# Patient Record
Sex: FEMALE | Race: BLACK OR AFRICAN AMERICAN | NOT HISPANIC OR LATINO
[De-identification: names, ages, dates, MRNs, and addresses within clinical notes are randomized per-mention and may not be internally consistent; named-entity substitution may affect disease eponyms.]

---

## 2017-06-14 ENCOUNTER — FORM ENCOUNTER (OUTPATIENT)
Age: 27
End: 2017-06-14

## 2017-06-15 ENCOUNTER — OUTPATIENT (OUTPATIENT)
Dept: OUTPATIENT SERVICES | Facility: HOSPITAL | Age: 27
LOS: 1 days | End: 2017-06-15
Payer: COMMERCIAL

## 2017-06-15 ENCOUNTER — APPOINTMENT (OUTPATIENT)
Dept: ULTRASOUND IMAGING | Facility: IMAGING CENTER | Age: 27
End: 2017-06-15

## 2017-06-15 DIAGNOSIS — R10.2 PELVIC AND PERINEAL PAIN: ICD-10-CM

## 2017-06-15 DIAGNOSIS — N83.201 UNSPECIFIED OVARIAN CYST, RIGHT SIDE: ICD-10-CM

## 2017-06-15 PROCEDURE — 76856 US EXAM PELVIC COMPLETE: CPT

## 2017-06-15 PROCEDURE — 76830 TRANSVAGINAL US NON-OB: CPT

## 2017-06-21 ENCOUNTER — APPOINTMENT (OUTPATIENT)
Dept: OBGYN | Facility: CLINIC | Age: 27
End: 2017-06-21

## 2017-07-03 ENCOUNTER — RX RENEWAL (OUTPATIENT)
Age: 27
End: 2017-07-03

## 2017-07-21 ENCOUNTER — OUTPATIENT (OUTPATIENT)
Dept: OUTPATIENT SERVICES | Facility: HOSPITAL | Age: 27
LOS: 1 days | End: 2017-07-21
Payer: COMMERCIAL

## 2017-07-21 VITALS
HEIGHT: 60 IN | OXYGEN SATURATION: 100 % | TEMPERATURE: 98 F | HEART RATE: 92 BPM | WEIGHT: 85.98 LBS | DIASTOLIC BLOOD PRESSURE: 72 MMHG | SYSTOLIC BLOOD PRESSURE: 108 MMHG | RESPIRATION RATE: 16 BRPM

## 2017-07-21 DIAGNOSIS — R10.2 PELVIC AND PERINEAL PAIN: ICD-10-CM

## 2017-07-21 DIAGNOSIS — Z01.818 ENCOUNTER FOR OTHER PREPROCEDURAL EXAMINATION: ICD-10-CM

## 2017-07-21 DIAGNOSIS — D25.9 LEIOMYOMA OF UTERUS, UNSPECIFIED: ICD-10-CM

## 2017-07-21 DIAGNOSIS — N60.09 SOLITARY CYST OF UNSPECIFIED BREAST: Chronic | ICD-10-CM

## 2017-07-21 LAB
BLD GP AB SCN SERPL QL: NEGATIVE — SIGNIFICANT CHANGE UP
HCT VFR BLD CALC: 35.3 % — SIGNIFICANT CHANGE UP (ref 34.5–45)
HGB BLD-MCNC: 12.1 G/DL — SIGNIFICANT CHANGE UP (ref 11.5–15.5)
MCHC RBC-ENTMCNC: 26.4 PG — LOW (ref 27–34)
MCHC RBC-ENTMCNC: 34.3 GM/DL — SIGNIFICANT CHANGE UP (ref 32–36)
MCV RBC AUTO: 76.9 FL — LOW (ref 80–100)
PLATELET # BLD AUTO: 262 K/UL — SIGNIFICANT CHANGE UP (ref 150–400)
RBC # BLD: 4.59 M/UL — SIGNIFICANT CHANGE UP (ref 3.8–5.2)
RBC # FLD: 12.4 % — SIGNIFICANT CHANGE UP (ref 10.3–14.5)
RH IG SCN BLD-IMP: POSITIVE — SIGNIFICANT CHANGE UP
WBC # BLD: 4.13 K/UL — SIGNIFICANT CHANGE UP (ref 3.8–10.5)
WBC # FLD AUTO: 4.13 K/UL — SIGNIFICANT CHANGE UP (ref 3.8–10.5)

## 2017-07-21 PROCEDURE — 85027 COMPLETE CBC AUTOMATED: CPT

## 2017-07-21 PROCEDURE — 86901 BLOOD TYPING SEROLOGIC RH(D): CPT

## 2017-07-21 PROCEDURE — G0463: CPT

## 2017-07-21 PROCEDURE — 86850 RBC ANTIBODY SCREEN: CPT

## 2017-07-21 PROCEDURE — 86900 BLOOD TYPING SEROLOGIC ABO: CPT

## 2017-07-21 RX ORDER — ACETAMINOPHEN 500 MG
975 TABLET ORAL ONCE
Qty: 0 | Refills: 0 | Status: COMPLETED | OUTPATIENT
Start: 2017-07-28 | End: 2017-07-28

## 2017-07-21 RX ORDER — OXYCODONE HYDROCHLORIDE 5 MG/1
10 TABLET ORAL ONCE
Qty: 0 | Refills: 0 | Status: DISCONTINUED | OUTPATIENT
Start: 2017-07-28 | End: 2017-07-28

## 2017-07-21 RX ORDER — CEFOTETAN DISODIUM 1 G
2 VIAL (EA) INJECTION ONCE
Qty: 0 | Refills: 0 | Status: DISCONTINUED | OUTPATIENT
Start: 2017-07-28 | End: 2017-08-12

## 2017-07-21 NOTE — H&P PST ADULT - NSANTHOSAYNRD_GEN_A_CORE
No. CAROLANN screening performed.  STOP BANG Legend: 0-2 = LOW Risk; 3-4 = INTERMEDIATE Risk; 5-8 = HIGH Risk

## 2017-07-21 NOTE — H&P PST ADULT - HISTORY OF PRESENT ILLNESS
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

## 2017-07-28 ENCOUNTER — OUTPATIENT (OUTPATIENT)
Dept: OUTPATIENT SERVICES | Facility: HOSPITAL | Age: 27
LOS: 1 days | End: 2017-07-28
Payer: COMMERCIAL

## 2017-07-28 ENCOUNTER — TRANSCRIPTION ENCOUNTER (OUTPATIENT)
Age: 27
End: 2017-07-28

## 2017-07-28 ENCOUNTER — RESULT REVIEW (OUTPATIENT)
Age: 27
End: 2017-07-28

## 2017-07-28 ENCOUNTER — APPOINTMENT (OUTPATIENT)
Dept: OBGYN | Facility: HOSPITAL | Age: 27
End: 2017-07-28

## 2017-07-28 VITALS — HEART RATE: 70 BPM | DIASTOLIC BLOOD PRESSURE: 58 MMHG | SYSTOLIC BLOOD PRESSURE: 94 MMHG | RESPIRATION RATE: 18 BRPM

## 2017-07-28 VITALS
HEIGHT: 62 IN | WEIGHT: 85.98 LBS | DIASTOLIC BLOOD PRESSURE: 65 MMHG | TEMPERATURE: 98 F | SYSTOLIC BLOOD PRESSURE: 94 MMHG | OXYGEN SATURATION: 100 % | RESPIRATION RATE: 18 BRPM | HEART RATE: 72 BPM

## 2017-07-28 DIAGNOSIS — D25.9 LEIOMYOMA OF UTERUS, UNSPECIFIED: ICD-10-CM

## 2017-07-28 DIAGNOSIS — N60.09 SOLITARY CYST OF UNSPECIFIED BREAST: Chronic | ICD-10-CM

## 2017-07-28 DIAGNOSIS — R10.2 PELVIC AND PERINEAL PAIN: ICD-10-CM

## 2017-07-28 DIAGNOSIS — Z98.890 OTHER SPECIFIED POSTPROCEDURAL STATES: ICD-10-CM

## 2017-07-28 LAB
HCG UR QL: NEGATIVE — SIGNIFICANT CHANGE UP
RH IG SCN BLD-IMP: POSITIVE — SIGNIFICANT CHANGE UP

## 2017-07-28 PROCEDURE — S2900 ROBOTIC SURGICAL SYSTEM: CPT | Mod: 80,NC

## 2017-07-28 PROCEDURE — C1765: CPT

## 2017-07-28 PROCEDURE — S2900 ROBOTIC SURGICAL SYSTEM: CPT | Mod: NC

## 2017-07-28 PROCEDURE — C1889: CPT

## 2017-07-28 PROCEDURE — S2900: CPT

## 2017-07-28 PROCEDURE — 81025 URINE PREGNANCY TEST: CPT

## 2017-07-28 PROCEDURE — 88305 TISSUE EXAM BY PATHOLOGIST: CPT | Mod: 26

## 2017-07-28 PROCEDURE — 86900 BLOOD TYPING SEROLOGIC ABO: CPT

## 2017-07-28 PROCEDURE — 58545 LAPAROSCOPIC MYOMECTOMY: CPT

## 2017-07-28 PROCEDURE — 58545 LAPAROSCOPIC MYOMECTOMY: CPT | Mod: 80

## 2017-07-28 PROCEDURE — 86901 BLOOD TYPING SEROLOGIC RH(D): CPT

## 2017-07-28 PROCEDURE — 88305 TISSUE EXAM BY PATHOLOGIST: CPT

## 2017-07-28 RX ORDER — ACETAMINOPHEN 500 MG
1000 TABLET ORAL ONCE
Qty: 0 | Refills: 0 | Status: COMPLETED | OUTPATIENT
Start: 2017-07-28 | End: 2017-07-28

## 2017-07-28 RX ORDER — KETOROLAC TROMETHAMINE 30 MG/ML
30 SYRINGE (ML) INJECTION ONCE
Qty: 0 | Refills: 0 | Status: DISCONTINUED | OUTPATIENT
Start: 2017-07-28 | End: 2017-07-28

## 2017-07-28 RX ORDER — BENZOCAINE AND MENTHOL 5; 1 G/100ML; G/100ML
1 LIQUID ORAL
Qty: 0 | Refills: 0 | Status: DISCONTINUED | OUTPATIENT
Start: 2017-07-28 | End: 2017-08-12

## 2017-07-28 RX ORDER — OXYCODONE HYDROCHLORIDE 5 MG/1
5 TABLET ORAL EVERY 4 HOURS
Qty: 0 | Refills: 0 | Status: DISCONTINUED | OUTPATIENT
Start: 2017-07-28 | End: 2017-07-28

## 2017-07-28 RX ORDER — CELECOXIB 200 MG/1
200 CAPSULE ORAL ONCE
Qty: 0 | Refills: 0 | Status: COMPLETED | OUTPATIENT
Start: 2017-07-28 | End: 2017-07-28

## 2017-07-28 RX ORDER — SODIUM CHLORIDE 9 MG/ML
1000 INJECTION, SOLUTION INTRAVENOUS
Qty: 0 | Refills: 0 | Status: DISCONTINUED | OUTPATIENT
Start: 2017-07-28 | End: 2017-08-12

## 2017-07-28 RX ORDER — ONDANSETRON 8 MG/1
4 TABLET, FILM COATED ORAL ONCE
Qty: 0 | Refills: 0 | Status: COMPLETED | OUTPATIENT
Start: 2017-07-28 | End: 2017-07-28

## 2017-07-28 RX ORDER — ACETAMINOPHEN 500 MG
975 TABLET ORAL EVERY 6 HOURS
Qty: 0 | Refills: 0 | Status: DISCONTINUED | OUTPATIENT
Start: 2017-07-28 | End: 2017-08-12

## 2017-07-28 RX ORDER — SODIUM CHLORIDE 9 MG/ML
3 INJECTION INTRAMUSCULAR; INTRAVENOUS; SUBCUTANEOUS EVERY 8 HOURS
Qty: 0 | Refills: 0 | Status: DISCONTINUED | OUTPATIENT
Start: 2017-07-28 | End: 2017-07-28

## 2017-07-28 RX ORDER — HYDROMORPHONE HYDROCHLORIDE 2 MG/ML
0.25 INJECTION INTRAMUSCULAR; INTRAVENOUS; SUBCUTANEOUS
Qty: 0 | Refills: 0 | Status: DISCONTINUED | OUTPATIENT
Start: 2017-07-28 | End: 2017-07-30

## 2017-07-28 RX ADMIN — Medication 975 MILLIGRAM(S): at 23:55

## 2017-07-28 RX ADMIN — Medication 1000 MILLIGRAM(S): at 18:34

## 2017-07-28 RX ADMIN — SODIUM CHLORIDE 125 MILLILITER(S): 9 INJECTION, SOLUTION INTRAVENOUS at 18:49

## 2017-07-28 RX ADMIN — HYDROMORPHONE HYDROCHLORIDE 0.25 MILLIGRAM(S): 2 INJECTION INTRAMUSCULAR; INTRAVENOUS; SUBCUTANEOUS at 18:34

## 2017-07-28 RX ADMIN — HYDROMORPHONE HYDROCHLORIDE 0.25 MILLIGRAM(S): 2 INJECTION INTRAMUSCULAR; INTRAVENOUS; SUBCUTANEOUS at 17:45

## 2017-07-28 RX ADMIN — ONDANSETRON 4 MILLIGRAM(S): 8 TABLET, FILM COATED ORAL at 22:53

## 2017-07-28 RX ADMIN — HYDROMORPHONE HYDROCHLORIDE 0.25 MILLIGRAM(S): 2 INJECTION INTRAMUSCULAR; INTRAVENOUS; SUBCUTANEOUS at 18:15

## 2017-07-28 RX ADMIN — OXYCODONE HYDROCHLORIDE 10 MILLIGRAM(S): 5 TABLET ORAL at 13:36

## 2017-07-28 RX ADMIN — Medication 30 MILLIGRAM(S): at 20:39

## 2017-07-28 RX ADMIN — SODIUM CHLORIDE 3 MILLILITER(S): 9 INJECTION INTRAMUSCULAR; INTRAVENOUS; SUBCUTANEOUS at 11:07

## 2017-07-28 RX ADMIN — Medication 400 MILLIGRAM(S): at 18:15

## 2017-07-28 RX ADMIN — Medication 975 MILLIGRAM(S): at 11:06

## 2017-07-28 RX ADMIN — Medication 975 MILLIGRAM(S): at 23:16

## 2017-07-28 NOTE — PROGRESS NOTE ADULT - PROBLEM SELECTOR PLAN 1
Neuro: c/w oral analgesia, Toradol 30 IVP now, cepacol logenzes for throat pain  CV: hemodynamically stable  Pulm: saturating well on room air, encourage incentive spirometry and ambulation  GI: advance to reg diet, mylicon, colace, and zofran as needed  : voiding adequately  Heme: ambulation, SCDs for DVT ppx  Dispo: discharge home when pain improved    dw Dr Baires

## 2017-07-28 NOTE — PROGRESS NOTE ADULT - SUBJECTIVE AND OBJECTIVE BOX
Patient seen and examined at bedside. Patient complains of throat pain and of cramping abdominal pain, which is normal menstrual cramps for her.  Patient is immediately s/p voiding 200 cc, which relieved some of the abdominal pain.  Patient is s/p IV tylenol, dilaudid, and oxycodone around 6 pm  Patient is not yet tolerating clears. Has not yet passed flatus. Denies CP, SOB, N/V, fevers, and chills.    Vital Signs Last 24 Hours  T(C): 36.3 (07-28-17 @ 17:35), Max: 36.7 (07-28-17 @ 11:14)  HR: 76 (07-28-17 @ 20:30) (60 - 91)  BP: 80/42 (07-28-17 @ 20:30) (80/42 - 101/49)  RR: 16 (07-28-17 @ 20:30) (15 - 18)  SpO2: 95% (07-28-17 @ 20:30) (95% - 100%)    I&O's Summary    28 Jul 2017 07:01  -  28 Jul 2017 20:37  --------------------------------------------------------  IN: 375 mL / OUT: 200 mL / NET: 175 mL        Physical Exam:  General: NAD  CV: NR, RR, S1, S2, no M/R/G  Lungs: CTA-B  Abdomen: Soft, non-distended, appropriately tender  Incisions: CDI  Vaginal bleeding minimal  Ext: No pain or swelling          MEDICATIONS  (STANDING):  cefoTEtan  IVPB 2 Gram(s) IV Intermittent once  lactated ringers. 1000 milliLiter(s) (125 mL/Hr) IV Continuous <Continuous>  ketorolac   Injectable 30 milliGRAM(s) IV Push once    MEDICATIONS  (PRN):  HYDROmorphone  Injectable 0.25 milliGRAM(s) IV Push every 10 minutes PRN Moderate Pain  ondansetron Injectable 4 milliGRAM(s) IV Push once PRN Nausea and/or Vomiting  benzocaine 15 mG/menthol 3.6 mG Lozenge 1 Lozenge Oral every 1 hour PRN Sore Throat

## 2017-07-28 NOTE — ASU DISCHARGE PLAN (ADULT/PEDIATRIC). - NOTIFY
GYN Fever>100.4/Bleeding that does not stop/Swelling that continues/Numbness, tingling/Persistent Nausea and Vomiting/Pain not relieved by Medications/Unable to Urinate/Numbness, color, or temperature change to extremity/Increased Irritability or Sluggishness/Excessive Diarrhea/Inability to Tolerate Liquids or Foods

## 2017-07-28 NOTE — ASU DISCHARGE PLAN (ADULT/PEDIATRIC). - MEDICATION SUMMARY - MEDICATIONS TO TAKE
I will START or STAY ON the medications listed below when I get home from the hospital:    Percocet 5/325 oral tablet  -- 1 tab(s) by mouth every 6 hours, As Needed MDD:4 tabs  -- Caution federal law prohibits the transfer of this drug to any person other  than the person for whom it was prescribed.  May cause drowsiness.  Alcohol may intensify this effect.  Use care when operating dangerous machinery.  This prescription cannot be refilled.  This product contains acetaminophen.  Do not use  with any other product containing acetaminophen to prevent possible liver damage.  Using more of this medication than prescribed may cause serious breathing problems.    -- Indication: For Pain    naproxen 500 mg oral tablet  -- 1 tab(s) by mouth 2 times a day, As Needed dysmenorrhea  -- Indication: For home med

## 2017-08-01 ENCOUNTER — APPOINTMENT (OUTPATIENT)
Dept: OBGYN | Facility: CLINIC | Age: 27
End: 2017-08-01

## 2017-08-01 ENCOUNTER — INPATIENT (INPATIENT)
Facility: HOSPITAL | Age: 27
LOS: 1 days | Discharge: HOME | DRG: 948 | End: 2017-08-03
Attending: OBSTETRICS & GYNECOLOGY | Admitting: OBSTETRICS & GYNECOLOGY
Payer: COMMERCIAL

## 2017-08-01 VITALS
TEMPERATURE: 98 F | RESPIRATION RATE: 18 BRPM | HEART RATE: 78 BPM | SYSTOLIC BLOOD PRESSURE: 115 MMHG | DIASTOLIC BLOOD PRESSURE: 72 MMHG | OXYGEN SATURATION: 98 %

## 2017-08-01 DIAGNOSIS — N60.09 SOLITARY CYST OF UNSPECIFIED BREAST: Chronic | ICD-10-CM

## 2017-08-01 DIAGNOSIS — R10.2 PELVIC AND PERINEAL PAIN: ICD-10-CM

## 2017-08-01 DIAGNOSIS — G89.18 OTHER ACUTE POSTPROCEDURAL PAIN: ICD-10-CM

## 2017-08-01 DIAGNOSIS — D25.9 LEIOMYOMA OF UTERUS, UNSPECIFIED: Chronic | ICD-10-CM

## 2017-08-01 LAB
ALBUMIN SERPL ELPH-MCNC: 4.1 G/DL — SIGNIFICANT CHANGE UP (ref 3.3–5)
ALP SERPL-CCNC: 44 U/L — SIGNIFICANT CHANGE UP (ref 40–120)
ALT FLD-CCNC: 28 U/L RC — SIGNIFICANT CHANGE UP (ref 10–45)
ANION GAP SERPL CALC-SCNC: 12 MMOL/L — SIGNIFICANT CHANGE UP (ref 5–17)
APPEARANCE UR: CLEAR — SIGNIFICANT CHANGE UP
AST SERPL-CCNC: 23 U/L — SIGNIFICANT CHANGE UP (ref 10–40)
BACTERIA # UR AUTO: ABNORMAL /HPF
BASE EXCESS BLDV CALC-SCNC: 5.3 MMOL/L — HIGH (ref -2–2)
BASOPHILS # BLD AUTO: 0 K/UL — SIGNIFICANT CHANGE UP (ref 0–0.2)
BASOPHILS NFR BLD AUTO: 0.7 % — SIGNIFICANT CHANGE UP (ref 0–2)
BILIRUB SERPL-MCNC: 0.7 MG/DL — SIGNIFICANT CHANGE UP (ref 0.2–1.2)
BILIRUB UR-MCNC: NEGATIVE — SIGNIFICANT CHANGE UP
BUN SERPL-MCNC: 5 MG/DL — LOW (ref 7–23)
CA-I SERPL-SCNC: 1.17 MMOL/L — SIGNIFICANT CHANGE UP (ref 1.12–1.3)
CALCIUM SERPL-MCNC: 8.9 MG/DL — SIGNIFICANT CHANGE UP (ref 8.4–10.5)
CHLORIDE BLDV-SCNC: 104 MMOL/L — SIGNIFICANT CHANGE UP (ref 96–108)
CHLORIDE SERPL-SCNC: 103 MMOL/L — SIGNIFICANT CHANGE UP (ref 96–108)
CO2 BLDV-SCNC: 32 MMOL/L — HIGH (ref 22–30)
CO2 SERPL-SCNC: 28 MMOL/L — SIGNIFICANT CHANGE UP (ref 22–31)
COLOR SPEC: COLORLESS — SIGNIFICANT CHANGE UP
CREAT SERPL-MCNC: 0.63 MG/DL — SIGNIFICANT CHANGE UP (ref 0.5–1.3)
DIFF PNL FLD: NEGATIVE — SIGNIFICANT CHANGE UP
EOSINOPHIL # BLD AUTO: 0.1 K/UL — SIGNIFICANT CHANGE UP (ref 0–0.5)
EOSINOPHIL NFR BLD AUTO: 2 % — SIGNIFICANT CHANGE UP (ref 0–6)
EPI CELLS # UR: SIGNIFICANT CHANGE UP /HPF
GAS PNL BLDV: 137 MMOL/L — SIGNIFICANT CHANGE UP (ref 136–145)
GAS PNL BLDV: SIGNIFICANT CHANGE UP
GAS PNL BLDV: SIGNIFICANT CHANGE UP
GLUCOSE BLDV-MCNC: 118 MG/DL — HIGH (ref 70–99)
GLUCOSE SERPL-MCNC: 121 MG/DL — HIGH (ref 70–99)
GLUCOSE UR QL: NEGATIVE — SIGNIFICANT CHANGE UP
HCO3 BLDV-SCNC: 31 MMOL/L — HIGH (ref 21–29)
HCT VFR BLD CALC: 32.2 % — LOW (ref 34.5–45)
HCT VFR BLDA CALC: 35 % — LOW (ref 39–50)
HGB BLD CALC-MCNC: 11.4 G/DL — LOW (ref 11.5–15.5)
HGB BLD-MCNC: 11.1 G/DL — LOW (ref 11.5–15.5)
KETONES UR-MCNC: NEGATIVE — SIGNIFICANT CHANGE UP
LACTATE BLDV-MCNC: 1.2 MMOL/L — SIGNIFICANT CHANGE UP (ref 0.7–2)
LEUKOCYTE ESTERASE UR-ACNC: NEGATIVE — SIGNIFICANT CHANGE UP
LYMPHOCYTES # BLD AUTO: 1.6 K/UL — SIGNIFICANT CHANGE UP (ref 1–3.3)
LYMPHOCYTES # BLD AUTO: 41.9 % — SIGNIFICANT CHANGE UP (ref 13–44)
MCHC RBC-ENTMCNC: 28.4 PG — SIGNIFICANT CHANGE UP (ref 27–34)
MCHC RBC-ENTMCNC: 34.4 GM/DL — SIGNIFICANT CHANGE UP (ref 32–36)
MCV RBC AUTO: 82.7 FL — SIGNIFICANT CHANGE UP (ref 80–100)
MONOCYTES # BLD AUTO: 0.3 K/UL — SIGNIFICANT CHANGE UP (ref 0–0.9)
MONOCYTES NFR BLD AUTO: 6.7 % — SIGNIFICANT CHANGE UP (ref 2–14)
NEUTROPHILS # BLD AUTO: 1.9 K/UL — SIGNIFICANT CHANGE UP (ref 1.8–7.4)
NEUTROPHILS NFR BLD AUTO: 48.7 % — SIGNIFICANT CHANGE UP (ref 43–77)
NITRITE UR-MCNC: NEGATIVE — SIGNIFICANT CHANGE UP
PCO2 BLDV: 53 MMHG — HIGH (ref 35–50)
PH BLDV: 7.38 — SIGNIFICANT CHANGE UP (ref 7.35–7.45)
PH UR: 7 — SIGNIFICANT CHANGE UP (ref 5–8)
PLATELET # BLD AUTO: 186 K/UL — SIGNIFICANT CHANGE UP (ref 150–400)
PO2 BLDV: 38 MMHG — SIGNIFICANT CHANGE UP (ref 25–45)
POTASSIUM BLDV-SCNC: 4.3 MMOL/L — SIGNIFICANT CHANGE UP (ref 3.5–5)
POTASSIUM SERPL-MCNC: 3.5 MMOL/L — SIGNIFICANT CHANGE UP (ref 3.5–5.3)
POTASSIUM SERPL-SCNC: 3.5 MMOL/L — SIGNIFICANT CHANGE UP (ref 3.5–5.3)
PROT SERPL-MCNC: 7 G/DL — SIGNIFICANT CHANGE UP (ref 6–8.3)
PROT UR-MCNC: NEGATIVE — SIGNIFICANT CHANGE UP
RBC # BLD: 3.9 M/UL — SIGNIFICANT CHANGE UP (ref 3.8–5.2)
RBC # FLD: 10.9 % — SIGNIFICANT CHANGE UP (ref 10.3–14.5)
RBC CASTS # UR COMP ASSIST: SIGNIFICANT CHANGE UP /HPF (ref 0–2)
SAO2 % BLDV: 67 % — SIGNIFICANT CHANGE UP (ref 67–88)
SODIUM SERPL-SCNC: 143 MMOL/L — SIGNIFICANT CHANGE UP (ref 135–145)
SP GR SPEC: 1.01 — LOW (ref 1.01–1.02)
SURGICAL PATHOLOGY STUDY: SIGNIFICANT CHANGE UP
UROBILINOGEN FLD QL: NEGATIVE — SIGNIFICANT CHANGE UP
WBC # BLD: 3.8 K/UL — SIGNIFICANT CHANGE UP (ref 3.8–10.5)
WBC # FLD AUTO: 3.8 K/UL — SIGNIFICANT CHANGE UP (ref 3.8–10.5)
WBC UR QL: SIGNIFICANT CHANGE UP /HPF (ref 0–5)

## 2017-08-01 PROCEDURE — 99285 EMERGENCY DEPT VISIT HI MDM: CPT

## 2017-08-01 PROCEDURE — 74177 CT ABD & PELVIS W/CONTRAST: CPT | Mod: 26

## 2017-08-01 RX ORDER — SENNA PLUS 8.6 MG/1
2 TABLET ORAL AT BEDTIME
Qty: 0 | Refills: 0 | Status: DISCONTINUED | OUTPATIENT
Start: 2017-08-01 | End: 2017-08-02

## 2017-08-01 RX ORDER — SODIUM CHLORIDE 9 MG/ML
1000 INJECTION INTRAMUSCULAR; INTRAVENOUS; SUBCUTANEOUS ONCE
Qty: 0 | Refills: 0 | Status: COMPLETED | OUTPATIENT
Start: 2017-08-01 | End: 2017-08-01

## 2017-08-01 RX ORDER — SIMETHICONE 80 MG/1
80 TABLET, CHEWABLE ORAL EVERY 12 HOURS
Qty: 0 | Refills: 0 | Status: DISCONTINUED | OUTPATIENT
Start: 2017-08-01 | End: 2017-08-03

## 2017-08-01 RX ORDER — ACETAMINOPHEN 500 MG
975 TABLET ORAL EVERY 6 HOURS
Qty: 0 | Refills: 0 | Status: DISCONTINUED | OUTPATIENT
Start: 2017-08-01 | End: 2017-08-03

## 2017-08-01 RX ORDER — DOCUSATE SODIUM 100 MG
100 CAPSULE ORAL THREE TIMES A DAY
Qty: 0 | Refills: 0 | Status: DISCONTINUED | OUTPATIENT
Start: 2017-08-01 | End: 2017-08-03

## 2017-08-01 RX ORDER — OXYCODONE HYDROCHLORIDE 5 MG/1
5 TABLET ORAL EVERY 4 HOURS
Qty: 0 | Refills: 0 | Status: DISCONTINUED | OUTPATIENT
Start: 2017-08-01 | End: 2017-08-03

## 2017-08-01 RX ORDER — ONDANSETRON 8 MG/1
4 TABLET, FILM COATED ORAL ONCE
Qty: 0 | Refills: 0 | Status: COMPLETED | OUTPATIENT
Start: 2017-08-01 | End: 2017-08-01

## 2017-08-01 RX ORDER — IBUPROFEN 200 MG
600 TABLET ORAL EVERY 6 HOURS
Qty: 0 | Refills: 0 | Status: DISCONTINUED | OUTPATIENT
Start: 2017-08-01 | End: 2017-08-02

## 2017-08-01 RX ORDER — ACETAMINOPHEN 500 MG
1000 TABLET ORAL ONCE
Qty: 0 | Refills: 0 | Status: COMPLETED | OUTPATIENT
Start: 2017-08-01 | End: 2017-08-01

## 2017-08-01 RX ADMIN — ONDANSETRON 4 MILLIGRAM(S): 8 TABLET, FILM COATED ORAL at 18:10

## 2017-08-01 RX ADMIN — Medication 400 MILLIGRAM(S): at 18:10

## 2017-08-01 RX ADMIN — SODIUM CHLORIDE 2000 MILLILITER(S): 9 INJECTION INTRAMUSCULAR; INTRAVENOUS; SUBCUTANEOUS at 16:42

## 2017-08-01 NOTE — H&P ADULT - PROBLEM SELECTOR PLAN 1
- Admit to GYN   - PO Motrin, Tylenol, and oxycodone PRN   - senna/colace  - Zofran prn for nausea  - regular diet  - DVT ppx: OOB

## 2017-08-01 NOTE — CONSULT NOTE ADULT - ATTENDING COMMENTS
Agree with above assessment and recommendations. POD4 with significant abd pain, nausea and lack of bowel movement. R/o obstruction, infection, surgical bleeding with CT scan. Admit overnight for obs and pain control

## 2017-08-01 NOTE — ED ADULT NURSE NOTE - OBJECTIVE STATEMENT
27F comes to ED c/o abdominal pain, nausea, vomiting, weakness and headache since Friday. 27F comes to ED c/o abdominal pain, nausea, vomiting, weakness and headache since Friday. On Friday she had 6 uterine fibroids removed and ever since, she has had nausea with vomiting. She states she was able to eat today without vomiting- last episode of vomiting was yesterday. She also currently has her menstrual period. She denies SOB/chest pain/D/dizziness/fever. She has lower abdominal pain at incision, headache and back pain. She also has intermittent lightheadedness. Taking oxycodone at home for pain. Family at bedside. Will continue to monitor.

## 2017-08-01 NOTE — H&P ADULT - HISTORY OF PRESENT ILLNESS
Patient is 26yo G0 LMP 7/30 presenting with chief complaint of abdominal pain status post robotic assisted myomectomy on 7/28. Patient notes that pain started on 7/28. Her pain has not improved post op and worsened on 7/29 after discharge. Patient notes the pain was initially central and now is diffuse. It is 9/10 pain and sharp. It is associated with persistent nausea and vomiting over the last 2 days. It is also associated with bilateral back pain. Patient is unable to tolerate PO since her case. Pain has not improved with Naprosyn and Oxycodone. She denies chest pain, shortness of breath, fevers, and chills. Patient denies abnormal vaginal bleeding, abnormal vaginal discharge, and spotting. Patient currently has her period. Patient denies dysuria, hematuria, back pain and urinary frequency. Patient has not had a bowel movement since the morning of 7/28 and passed flatus sporadically (once a day). She denies constipation and diarrhea.    In ED, patient received IV Tylenol x1, with mild relief of pain to 7/10. She had a sandwich and ginger ale, and endorses hunger. Still some nausea; however, no vomiting.   Patient was taking oxycodone around the clock at home, not taking naproxyn or Tylenol.     POb: G0    PGyn: denies h/o AUB, fibroids, ovarian cysts, PID, GC/CL, HIV, Hepatitis, abnormal PAPs, infertility, GYN surgery, menstrual history, number of sexual partners    PMH: denies (asthma, HTN, thyroid disorder, DM), h/o hospitalizations    PSH: robotic assisted myomectomy    Meds: Oxycodone, Naprosyn    All: Nkda    Social: denies history smoking cigarettes, alcohol dependence, illicit drug use    Family hx: denies family history of ovarian, uterine, cervical, breast, colon cancer (if yes relationship, alive or not, age or diagnosis)

## 2017-08-01 NOTE — ED ADULT NURSE NOTE - CHPI ED SYMPTOMS NEG
no fever/no diarrhea/no chills/no hematuria/no burning urination/no dysuria/no abdominal distension/no blood in stool

## 2017-08-01 NOTE — H&P ADULT - NSHPPHYSICALEXAM_GEN_ALL_CORE
Gen: appears uncomfortable 2/2 pain  CV RRR, no murmurs  Pulm CTAB  Abd: hypoactive BS, soft, mod-sev lower abdominal tenderness with voluntary guarding, no rebound tenderness.  Ext: warm/well perfused, no edema

## 2017-08-01 NOTE — ED PROVIDER NOTE - OBJECTIVE STATEMENT
26 y/o F with PMHX of fibroids s/p myomectomy 3 days ago c/o abd pain, Denies nausea, vomiting. Currently taking Naproxen and oxycodone 28 y/o F with PMHX of fibroids s/p myomectomy 3 days ago presents with abd pain. Pain is dull, diffuse, states she had the pain after surgery and on discharge and has not improved. Pt currently taking naproxen and oxycodone.  Denies nausea, vomiting. + Flatus. No fever or chills.

## 2017-08-01 NOTE — ED PROVIDER NOTE - MEDICAL DECISION MAKING DETAILS
****ATTENDING**** 26yo f s/p myomectomy w abd pain, n/v. Pt with diffuse tenderness. Afebrile in ED. Check Labs, CT to eval for sbo vs infection. Pain control and IVF and re eval.

## 2017-08-01 NOTE — ED PROVIDER NOTE - PROGRESS NOTE DETAILS
Patient was re-evaluated, found in no acute distress, calm, speaking in complete sentences, describing marked improvement of symptoms following treatment at ED. CT report is pending for final disposition. Dr. Ez Fernandez Ob/Gyn re-evaluated patient and describe plan to admit patient. Patient admitted under care of Dr. Solorio. Dr. Ez Fernandez

## 2017-08-01 NOTE — ED CLERICAL - BED REQUESTED
"Pt states that she hasn't been able to sleep for past 3 days, feels fatigued, has anxiety per pt ""It's hard to describe. My joints hurt all over. I talked to the doctor few weeks ago at my last visit, I was fatigued and joints hurt then. \"" had dose change 3/29/17.  Will have Dr Francia Chaney review  " 01-Aug-2017 23:11

## 2017-08-01 NOTE — CONSULT NOTE ADULT - ASSESSMENT
26yo G0 status post robotic assisted myomectomy POD 4 (7/28) presents with abdominal pain, nausea and vomiting. Differential diagnosis includes ileus, sbo, enterotomy, lower on differential is abscess, hematoma/seroma,  injury. Recommend ordering CT abdomen and pelvis w/ and wo IV and PO contrast.     Sue, R3 willi.w Dr. Baires

## 2017-08-01 NOTE — H&P ADULT - NSHPLABSRESULTS_GEN_ALL_CORE
EXAM:  CT ABDOMEN AND PELVIS OC IC                            PROCEDURE DATE:  08/01/2017        INTERPRETATION:  CLINICAL INFORMATION: Abdominal pain status post   myomectomy on 07/28.Evaluate for small bowel obstruction.    COMPARISON: No available comparisons.    PROCEDURE:   CT of the Abdomen and Pelvis was performed with intravenous contrast.   Intravenous contrast: 90 ml Omnipaque 350. 10 ml discarded.  Oral contrast: positive contrast was administered.  Sagittal and coronal reformatswere performed.    FINDINGS:    LOWER CHEST: Trace bilateral pleural effusions and bibasilar atelectasis.    LIVER: Mild nonspecific periportal edema. Focal hepatic steatosis along   the falciform ligament. Subcentimeter low-attenuation lesion in theright   hepatic lobe is too small to characterize.  BILE DUCTS: Normal caliber.  GALLBLADDER: Nonspecific pericholecystic edema.  SPLEEN: Within normal limits.  PANCREAS: Within normal limits.  ADRENALS: Within normal limits.  KIDNEYS/URETERS: Withinnormal limits.    BLADDER: Within normal limits.  REPRODUCTIVE ORGANS: Left adnexal cyst measuring 2.1 x 1.6 cm.   Postsurgical changes surrounding the uterus consistent with patient's   known history of myomectomy.    BOWEL: No bowel obstruction or overt bowel wall thickening. Appendix is   not discretely identified.  PERITONEUM: Small to moderate volume of pneumoperitoneum and small volume   abdominal and pelvic ascites. No loculated rim-enhancing fluid collection   to suggest abscess. No mesenteric adenopathy.  VESSELS:  Within normal limits.  RETROPERITONEUM: No lymphadenopathy.    ABDOMINAL WALL: Thickening of the soft tissues at the level of umbilicus   likely due to recent surgery. Air tracking within the anterior abdominal   wall and intramuscular fat planes from recent surgery.  BONES: Within normal limits.    IMPRESSION:     Small to moderate volume of pneumoperitoneum and small volume abdominal   and pelvic ascites, likely postsurgical. No loculated rim-enhancing   collection to suggest abscess. No bowel obstruction or overt bowel wall   thickening. 11.1   3.8   )-----------( 186      ( 01 Aug 2017 16:41 )             32.2     Urinalysis Basic - ( 01 Aug 2017 18:49 )    Color: x / Appearance: Clear / S.007 / pH: x  Gluc: x / Ketone: Negative  / Bili: Negative / Urobili: Negative   Blood: x / Protein: Negative / Nitrite: Negative   Leuk Esterase: Negative / RBC: 0-2 /HPF / WBC 0-2 /HPF   Sq Epi: x / Non Sq Epi: OCC /HPF / Bacteria: Few /HPF        EXAM:  CT ABDOMEN AND PELVIS OC IC                            PROCEDURE DATE:  2017        INTERPRETATION:  CLINICAL INFORMATION: Abdominal pain status post   myomectomy on .Evaluate for small bowel obstruction.    COMPARISON: No available comparisons.    PROCEDURE:   CT of the Abdomen and Pelvis was performed with intravenous contrast.   Intravenous contrast: 90 ml Omnipaque 350. 10 ml discarded.  Oral contrast: positive contrast was administered.  Sagittal and coronal reformatswere performed.    FINDINGS:    LOWER CHEST: Trace bilateral pleural effusions and bibasilar atelectasis.    LIVER: Mild nonspecific periportal edema. Focal hepatic steatosis along   the falciform ligament. Subcentimeter low-attenuation lesion in theright   hepatic lobe is too small to characterize.  BILE DUCTS: Normal caliber.  GALLBLADDER: Nonspecific pericholecystic edema.  SPLEEN: Within normal limits.  PANCREAS: Within normal limits.  ADRENALS: Within normal limits.  KIDNEYS/URETERS: Withinnormal limits.    BLADDER: Within normal limits.  REPRODUCTIVE ORGANS: Left adnexal cyst measuring 2.1 x 1.6 cm.   Postsurgical changes surrounding the uterus consistent with patient's   known history of myomectomy.    BOWEL: No bowel obstruction or overt bowel wall thickening. Appendix is   not discretely identified.  PERITONEUM: Small to moderate volume of pneumoperitoneum and small volume   abdominal and pelvic ascites. No loculated rim-enhancing fluid collection   to suggest abscess. No mesenteric adenopathy.  VESSELS:  Within normal limits.  RETROPERITONEUM: No lymphadenopathy.    ABDOMINAL WALL: Thickening of the soft tissues at the level of umbilicus   likely due to recent surgery. Air tracking within the anterior abdominal   wall and intramuscular fat planes from recent surgery.  BONES: Within normal limits.    IMPRESSION:     Small to moderate volume of pneumoperitoneum and small volume abdominal   and pelvic ascites, likely postsurgical. No loculated rim-enhancing   collection to suggest abscess. No bowel obstruction or overt bowel wall   thickening.

## 2017-08-01 NOTE — CONSULT NOTE ADULT - SUBJECTIVE AND OBJECTIVE BOX
Patient is 28yo G0 LMP 7/30 presenting with chief complaint of abdominal pain status post robotic assisted myomectomy on 7/28. Patient notes that pain started on 7/28. Her pain has not improved post op and worsened on 7/29 after discharge. Patient notes the pain was initially central and now is diffuse. It is 9/10 pain and sharp. It is associated with persistent nausea and vomiting over the last 2 days. It is also associated with bilateral back pain. Patient is unable to tolerate PO since her case. Pain has not improved with Naprosyn and Oxycodone. She denies chest pain, shortness of breath, fevers, and chills. Patient denies abnormal vaginal bleeding, abnormal vaginal discharge, and spotting. Patient currently has her period. Patient denies dysuria, hematuria, back pain and urinary frequency. Patient has not had a bowel movement since the morning of 7/28 and passed flatus sporadically (once a day). She denies constipation and diarrhea.    POb: G0    PGyn: denies h/o AUB, fibroids, ovarian cysts, PID, GC/CL, HIV, Hepatitis, abnormal PAPs, infertility, GYN surgery, menstrual history, number of sexual partners    PMH: denies (asthma, HTN, thyroid disorder, DM), h/o hospitalizations    PSH: robotic assisted myomectomy    Meds: Oxycodone, Naprosyn    All: Nkda    Social: denies history smoking cigarettes, alcohol dependence, illicit drug use    Family hx: denies family history of ovarian, uterine, cervical, breast, colon cancer (if yes relationship, alive or not, age or diagnosis)    ROS: As above    O:  VS:  ICU Vital Signs Last 24 Hrs  T(C): 36.8 (01 Aug 2017 17:10), Max: 36.9 (01 Aug 2017 14:25)  T(F): 98.2 (01 Aug 2017 17:10), Max: 98.4 (01 Aug 2017 14:25)  HR: 70 (01 Aug 2017 17:10) (70 - 78)  BP: 111/70 (01 Aug 2017 17:10) (111/70 - 115/72)  RR: 18 (01 Aug 2017 17:10) (18 - 18)  SpO2: 100% (01 Aug 2017 17:10) (98% - 100%)      Gen: appears ill 2/2 to pain  CV RRR  Pulm CTBL  Abd: soft, ND, hypoactive BS, mod-sev lower abdominal tenderne Patient is 26yo G0 LMP 7/30 presenting with chief complaint of abdominal pain status post robotic assisted myomectomy on 7/28. Patient notes that pain started on 7/28. Her pain has not improved post op and worsened on 7/29 after discharge. Patient notes the pain was initially central and now is diffuse. It is 9/10 pain and sharp. It is associated with persistent nausea and vomiting over the last 2 days. It is also associated with bilateral back pain. Patient is unable to tolerate PO since her case. Pain has not improved with Naprosyn and Oxycodone. She denies chest pain, shortness of breath, fevers, and chills. Patient denies abnormal vaginal bleeding, abnormal vaginal discharge, and spotting. Patient currently has her period. Patient denies dysuria, hematuria, back pain and urinary frequency. Patient has not had a bowel movement since the morning of 7/28 and passed flatus sporadically (once a day). She denies constipation and diarrhea.    POb: G0    PGyn: denies h/o AUB, fibroids, ovarian cysts, PID, GC/CL, HIV, Hepatitis, abnormal PAPs, infertility, GYN surgery, menstrual history, number of sexual partners    PMH: denies (asthma, HTN, thyroid disorder, DM), h/o hospitalizations    PSH: robotic assisted myomectomy    Meds: Oxycodone, Naprosyn    All: Nkda    Social: denies history smoking cigarettes, alcohol dependence, illicit drug use    Family hx: denies family history of ovarian, uterine, cervical, breast, colon cancer (if yes relationship, alive or not, age or diagnosis)    ROS: As above    O:  VS:  ICU Vital Signs Last 24 Hrs  T(C): 36.8 (01 Aug 2017 17:10), Max: 36.9 (01 Aug 2017 14:25)  T(F): 98.2 (01 Aug 2017 17:10), Max: 98.4 (01 Aug 2017 14:25)  HR: 70 (01 Aug 2017 17:10) (70 - 78)  BP: 111/70 (01 Aug 2017 17:10) (111/70 - 115/72)  RR: 18 (01 Aug 2017 17:10) (18 - 18)  SpO2: 100% (01 Aug 2017 17:10) (98% - 100%)      Gen: appears ill 2/2 to pain  CV RRR  Pulm CTBL  Abd: soft, ND, hypoactive BS, mod-sev lower abdominal tenderness (no pain meds given to patient before exam), voluntary guarding, no rebound  Extr; NTBL                          11.1   3.8   )-----------( 186      ( 01 Aug 2017 16:41 )             32.2     08-01 @ 16:41    143  |  103  |  5   ----------------------------<  121  3.5   |  28  |  0.63    Ca    8.9      08-01 @ 16:41    TPro  7.0  /  Alb  4.1  /  TBili  0.7  /  DBili  x   /  AST  23  /  ALT  28  /  AlkPhos  44  08-01 @ 16:41    VBG - ( 01 Aug 2017 16:41 )  pH: 7.38  /  pCO2: 53    /  pO2: 38    / HCO3: 31    / Base Excess: 5.3   /  SvO2: 67    / Lactate: 1.2

## 2017-08-01 NOTE — H&P ADULT - ASSESSMENT
26yo G0 POD#4 s/p robotic assisted myomectomy (7/28) with persistent abdominal pain and nausea.    Pt is hemodynamically stable, without signs of acute abdomen at this time. Labs and imaging not suspicious for SBO, developing abscess or hematoma.   Post-operative pneumoperitoneum likely contributory, and patient might benefit from bowel regimen.

## 2017-08-02 ENCOUNTER — TRANSCRIPTION ENCOUNTER (OUTPATIENT)
Age: 27
End: 2017-08-02

## 2017-08-02 RX ORDER — IBUPROFEN 200 MG
600 TABLET ORAL EVERY 6 HOURS
Qty: 0 | Refills: 0 | Status: DISCONTINUED | OUTPATIENT
Start: 2017-08-02 | End: 2017-08-03

## 2017-08-02 RX ORDER — POLYETHYLENE GLYCOL 3350 17 G/17G
17 POWDER, FOR SOLUTION ORAL DAILY
Qty: 0 | Refills: 0 | Status: DISCONTINUED | OUTPATIENT
Start: 2017-08-02 | End: 2017-08-03

## 2017-08-02 RX ORDER — IBUPROFEN 200 MG
1 TABLET ORAL
Qty: 0 | Refills: 0 | COMMUNITY
Start: 2017-08-02

## 2017-08-02 RX ORDER — OXYCODONE HYDROCHLORIDE 5 MG/1
10 TABLET ORAL EVERY 4 HOURS
Qty: 0 | Refills: 0 | Status: DISCONTINUED | OUTPATIENT
Start: 2017-08-02 | End: 2017-08-03

## 2017-08-02 RX ORDER — ONDANSETRON 8 MG/1
8 TABLET, FILM COATED ORAL EVERY 8 HOURS
Qty: 0 | Refills: 0 | Status: DISCONTINUED | OUTPATIENT
Start: 2017-08-02 | End: 2017-08-03

## 2017-08-02 RX ADMIN — Medication 100 MILLIGRAM(S): at 08:04

## 2017-08-02 RX ADMIN — Medication 600 MILLIGRAM(S): at 10:30

## 2017-08-02 RX ADMIN — Medication 975 MILLIGRAM(S): at 13:41

## 2017-08-02 RX ADMIN — Medication 975 MILLIGRAM(S): at 00:55

## 2017-08-02 RX ADMIN — Medication 975 MILLIGRAM(S): at 23:11

## 2017-08-02 RX ADMIN — SIMETHICONE 80 MILLIGRAM(S): 80 TABLET, CHEWABLE ORAL at 06:02

## 2017-08-02 RX ADMIN — Medication 975 MILLIGRAM(S): at 07:01

## 2017-08-02 RX ADMIN — Medication 600 MILLIGRAM(S): at 17:51

## 2017-08-02 RX ADMIN — POLYETHYLENE GLYCOL 3350 17 GRAM(S): 17 POWDER, FOR SOLUTION ORAL at 13:11

## 2017-08-02 RX ADMIN — SIMETHICONE 80 MILLIGRAM(S): 80 TABLET, CHEWABLE ORAL at 17:22

## 2017-08-02 RX ADMIN — OXYCODONE HYDROCHLORIDE 5 MILLIGRAM(S): 5 TABLET ORAL at 08:34

## 2017-08-02 RX ADMIN — Medication 975 MILLIGRAM(S): at 17:52

## 2017-08-02 RX ADMIN — OXYCODONE HYDROCHLORIDE 5 MILLIGRAM(S): 5 TABLET ORAL at 20:05

## 2017-08-02 RX ADMIN — OXYCODONE HYDROCHLORIDE 5 MILLIGRAM(S): 5 TABLET ORAL at 21:00

## 2017-08-02 RX ADMIN — Medication 600 MILLIGRAM(S): at 10:00

## 2017-08-02 RX ADMIN — Medication 975 MILLIGRAM(S): at 17:22

## 2017-08-02 RX ADMIN — Medication 600 MILLIGRAM(S): at 17:21

## 2017-08-02 RX ADMIN — Medication 600 MILLIGRAM(S): at 23:11

## 2017-08-02 RX ADMIN — Medication 975 MILLIGRAM(S): at 06:01

## 2017-08-02 RX ADMIN — OXYCODONE HYDROCHLORIDE 5 MILLIGRAM(S): 5 TABLET ORAL at 08:04

## 2017-08-02 RX ADMIN — Medication 975 MILLIGRAM(S): at 01:05

## 2017-08-02 RX ADMIN — Medication 975 MILLIGRAM(S): at 13:11

## 2017-08-02 NOTE — DISCHARGE NOTE ADULT - PLAN OF CARE
recovery Regular diet.  Resume normal activity as tolerated.  No heavy lifting, driving, or strenuous activity for 6 weeks.  Call your doctor with any signs and symptoms of infection such as fever, chills, nausea or vomiting.  Call your doctor with redness or swelling at the incision site, fluid leakage or wound separation.  Call your doctor if you're unable to tolerate food or have difficulty urinating.  Call your doctor if you have pain that is not relieved by your prescribed medications.  Notify your doctor with any other concerns.  Follow up with Dr. Solorio in 2 weeks. Take Motrin and Tylenol every 6 hours around the clock and Miralax daily. Regular diet.  Resume normal activity as tolerated.  No heavy lifting, driving, or strenuous activity for 6 weeks.  Call your doctor with any signs and symptoms of infection such as fever, chills, nausea or vomiting.  Call your doctor with redness or swelling at the incision site, fluid leakage or wound separation.  Call your doctor if you're unable to tolerate food or have difficulty urinating.  Call your doctor if you have pain that is not relieved by your prescribed medications.  Notify your doctor with any other concerns.  Follow up with Dr. Solorio in 2 weeks.

## 2017-08-02 NOTE — DISCHARGE NOTE ADULT - HOSPITAL COURSE
26 y/o a/w lower abdominal pain and N/Vx2 days post op Day 5 s/p RA myomectomy   HD#1 Pt was started on PO Motrin, Tylenol, and oxycodone PRN, senna/colace, Zofran prn for nausea and continued on a regular diet. Patient was discharged on HD#2 in stable condition with adequate pain control, ambulating, tolerating PO and voiding spontaneously.  Patient to follow up with Dr. Solorio in 2 weeks.

## 2017-08-02 NOTE — DISCHARGE NOTE ADULT - CARE PROVIDER_API CALL
Franco Solorio), Obstetrics and Gynecology  [address] [place] [address]  Phone: [phone]  Fax: [phone]

## 2017-08-02 NOTE — DISCHARGE NOTE ADULT - CARE PLAN
Principal Discharge DX:	Postoperative pain  Goal:	recovery  Instructions for follow-up, activity and diet:	Regular diet.  Resume normal activity as tolerated.  No heavy lifting, driving, or strenuous activity for 6 weeks.  Call your doctor with any signs and symptoms of infection such as fever, chills, nausea or vomiting.  Call your doctor with redness or swelling at the incision site, fluid leakage or wound separation.  Call your doctor if you're unable to tolerate food or have difficulty urinating.  Call your doctor if you have pain that is not relieved by your prescribed medications.  Notify your doctor with any other concerns.  Follow up with Dr. Solorio in 2 weeks. Principal Discharge DX:	Postoperative pain  Goal:	recovery  Instructions for follow-up, activity and diet:	Take Motrin and Tylenol every 6 hours around the clock and Miralax daily. Regular diet.  Resume normal activity as tolerated.  No heavy lifting, driving, or strenuous activity for 6 weeks.  Call your doctor with any signs and symptoms of infection such as fever, chills, nausea or vomiting.  Call your doctor with redness or swelling at the incision site, fluid leakage or wound separation.  Call your doctor if you're unable to tolerate food or have difficulty urinating.  Call your doctor if you have pain that is not relieved by your prescribed medications.  Notify your doctor with any other concerns.  Follow up with Dr. Solorio in 2 weeks.

## 2017-08-02 NOTE — PROGRESS NOTE ADULT - ATTENDING COMMENTS
MIS FELLOW NOTE  POD5 with significant abd pain, nausea, and no BM. Nonsurgical abdomen, no WBC, VSS and CT findings not concerning for acute process. Likely postop pain. Able to eat and ambulate.   pain regimen: standing motrin 600mg q6h, tylenol 650mg q4-6h, oxycodone 5-10mg q4h prn. May try dilaudid PO if patient not tolerating oxycodone.   nausea: zofran 8mg q8h prn  constipation: colace 100mg bid, miralax  recovery: encouraged incentive spirometry, ambulation  likely to d/c home today when pain controlled with rx for above regimens  NUZHAT Baires MD MIS FELLOW NOTE  POD5 with significant abd pain, nausea, and no BM. Nonsurgical abdomen, no WBC, VSS and CT findings not concerning for acute process. Likely postop pain. Able to eat and ambulate.   pain regimen: standing motrin 600mg q6h, tylenol 650mg q4-6h, oxycodone 5-10mg q4h prn. May try dilaudid PO if patient not tolerating oxycodone.   nausea: zofran 8mg q8h prn  constipation: colace 100mg bid, miralax  recovery: encouraged incentive spirometry, ambulation  likely to d/c home today when pain controlled with rx for above regimens  A Dequan SINGH    Addendum: Pt was admitted last PM postop laparoscopic myomectomy and resection of left uterosacral endometrioma  will 10/10 abd pain and nausea/vomiting.  Pt was afebrile with nondistended abd but with guarding. Sent to ER for pelvic/abd ct and cbc--results with normal postop changes and normal cbc admitted for close observation.  Today tolerating regualr diet with decreased pain - now appropriate for mis sx. will observe until AM and repeat cbc in AM prior to discharge.    Franco Solorio MD

## 2017-08-02 NOTE — PATIENT PROFILE ADULT. - VISION (WITH CORRECTIVE LENSES IF THE PATIENT USUALLY WEARS THEM):
Normal vision: sees adequately in most situations; can see medication labels, newsprint/Wears glasses/near sighted

## 2017-08-02 NOTE — DISCHARGE NOTE ADULT - MEDICATION SUMMARY - MEDICATIONS TO STOP TAKING
I will STOP taking the medications listed below when I get home from the hospital:  None I will STOP taking the medications listed below when I get home from the hospital:    naproxen 500 mg oral tablet  -- 1 tab(s) by mouth 2 times a day, As Needed dysmenorrhea    Percocet 5/325 oral tablet  -- 1 tab(s) by mouth every 6 hours, As Needed MDD:4 tabs  -- Caution federal law prohibits the transfer of this drug to any person other  than the person for whom it was prescribed.  May cause drowsiness.  Alcohol may intensify this effect.  Use care when operating dangerous machinery.  This prescription cannot be refilled.  This product contains acetaminophen.  Do not use  with any other product containing acetaminophen to prevent possible liver damage.  Using more of this medication than prescribed may cause serious breathing problems.

## 2017-08-02 NOTE — PROGRESS NOTE ADULT - SUBJECTIVE AND OBJECTIVE BOX
R1 GYN Progress Note    POD#5   HD#1    Patient seen and examined at bedside.  No acute events overnight. Pt is continuing to complain about abdominal pain, which is exacerbated with touch and deep inspiration She states that she is feeling nauseous, denies vomiting, and has been able to tolerate a regular diet. She has not had a BM or passed flatus since arrival. Pt denies F/C, SOB and CP.     Vital Signs Last 24 Hours  T(C): 36.8 (08-02-17 @ 05:50), Max: 36.9 (08-01-17 @ 14:25)  HR: 74 (08-02-17 @ 05:50) (70 - 78)  BP: 93/56 (08-02-17 @ 05:50) (93/56 - 115/72)  RR: 22 (08-02-17 @ 05:50) (18 - 22)  SpO2: 99% (08-02-17 @ 05:50) (98% - 100%)      Physical Exam:  General: NAD  CV: RR, S1, S2, no M/R/G  Lungs: CTA b/l,   Abdomen: Soft, appropriately tender, non-distended, +BS  Incision:3 port sites present and intact. Minimal serosanguinous fluid from umbilical port site, non erythematous, steri strip removed and guaze placed.   Ext: No pain or swelling , non tender B/L , no edema    Labs:                        11.1   3.8   )-----------( 186      ( 01 Aug 2017 16:41 )             32.2   baso 0.7    eos 2.0    imm gran x      lymph 41.9   mono 6.7    poly 48.7     CTAP: Small to moderate volume of pneumoperitoneum and small volume abdominal   and pelvic ascites, likely postsurgical. No loculated rim-enhancing   collection to suggest abscess. No bowel obstruction or overt bowel wall   thickening.      MEDICATIONS  (STANDING):  acetaminophen   Tablet. 975 milliGRAM(s) Oral every 6 hours  simethicone 80 milliGRAM(s) Chew every 12 hours    MEDICATIONS  (PRN):  ibuprofen  Tablet 600 milliGRAM(s) Oral every 6 hours PRN mild pain  senna 2 Tablet(s) Oral at bedtime PRN Constipation  docusate sodium 100 milliGRAM(s) Oral three times a day PRN stool softening.  oxyCODONE    IR 5 milliGRAM(s) Oral every 4 hours PRN Moderate Pain (4 - 6)  oxyCODONE    IR 10 milliGRAM(s) Oral every 4 hours PRN Severe Pain (7 - 10)      A/P: 27y  s/p  RA myomectomy a/w lower abdominal pain and nausea and vomiting x 2 days. Patient is stable and doing well.  CT A/P: no bowel obstruction.     Neuro: Tylenol, Motrin and Oxycodone PRN  CV: Hemodynamically stable, monitor VS per routine   Pulm: Saturating well on room air, encourage oob/amb  GI: Continue regular diet, senna colace and Zofran   : Voiding spontaneously   Dispo: To be seen by MIS fellow/attending     Dw. Dr. Sue Gonzalez PGY1 R1 GYN Progress Note    POD#5   HD#1    Patient seen and examined at bedside.  No acute events overnight. Pt is continuing to complain about abdominal pain, which is exacerbated with touch and deep inspiration She states that she is feeling nauseous, denies vomiting, and has been able to tolerate a regular diet. She has not had a BM or passed flatus since arrival. Pt denies F/C, SOB and CP.     Vital Signs Last 24 Hours  T(C): 36.8 (08-02-17 @ 05:50), Max: 36.9 (08-01-17 @ 14:25)  HR: 74 (08-02-17 @ 05:50) (70 - 78)  BP: 93/56 (08-02-17 @ 05:50) (93/56 - 115/72)  RR: 22 (08-02-17 @ 05:50) (18 - 22)  SpO2: 99% (08-02-17 @ 05:50) (98% - 100%)      Physical Exam:  General: NAD  CV: RR, S1, S2, no M/R/G  Lungs: CTA b/l,   Abdomen: Soft, appropriately tender, non-distended, +BS  Incision:3 port sites present and intact. Minimal serosanguinous fluid from umbilical port site, non erythematous, steri strip removed and guaze placed.   Ext: No pain or swelling , non tender B/L , no edema    Labs:                        11.1   3.8   )-----------( 186      ( 01 Aug 2017 16:41 )             32.2   baso 0.7    eos 2.0    imm gran x      lymph 41.9   mono 6.7    poly 48.7     CTAP: Small to moderate volume of pneumoperitoneum and small volume abdominal   and pelvic ascites, likely postsurgical. No loculated rim-enhancing   collection to suggest abscess. No bowel obstruction or overt bowel wall   thickening.      MEDICATIONS  (STANDING):  acetaminophen   Tablet. 975 milliGRAM(s) Oral every 6 hours  simethicone 80 milliGRAM(s) Chew every 12 hours    MEDICATIONS  (PRN):  ibuprofen  Tablet 600 milliGRAM(s) Oral every 6 hours PRN mild pain  senna 2 Tablet(s) Oral at bedtime PRN Constipation  docusate sodium 100 milliGRAM(s) Oral three times a day PRN stool softening.  oxyCODONE    IR 5 milliGRAM(s) Oral every 4 hours PRN Moderate Pain (4 - 6)  oxyCODONE    IR 10 milliGRAM(s) Oral every 4 hours PRN Severe Pain (7 - 10)      A/P: 27y  s/p  RA myomectomy a/w lower abdominal pain and nausea and vomiting x 2 days. Patient is stable and doing well.  CT A/P: no bowel obstruction.     Neuro: Tylenol, Motrin and Oxycodone PRN  CV: Hemodynamically stable, monitor VS per routine   Pulm: Saturating well on room air, encourage oob/amb  GI: Continue regular diet, senna colace and Zofran   : Voiding spontaneously   Heme: SCD for DVT PPX  Dispo: To be seen by MIS fellow/attending     Dw. Dr. Sue Gonzalez PGY1

## 2017-08-02 NOTE — DISCHARGE NOTE ADULT - VISION (WITH CORRECTIVE LENSES IF THE PATIENT USUALLY WEARS THEM):
Wears glasses/near sighted/Normal vision: sees adequately in most situations; can see medication labels, newsprint

## 2017-08-02 NOTE — PROGRESS NOTE ADULT - SUBJECTIVE AND OBJECTIVE BOX
R1 GYN Progress Note    POD5   HD1    Patient seen and examined at bedside to access pain control. Pt states that she is currently in much less pain on her current pain medications than when she arrived - now at a 6/10 compared to a 9-10/10. She states that she is able to ambulate through the halls and is tolerating a regular diet. She endorses passing flatus and voiding spontanously. She denies any F/C, N/V, CP, SOB,     Vital Signs Last 24 Hours  T(C): 36.9 (08-02-17 @ 14:37), Max: 36.9 (08-02-17 @ 00:40)  HR: 77 (08-02-17 @ 14:37) (70 - 77)  BP: 105/69 (08-02-17 @ 14:37) (91/54 - 114/72)  RR: 18 (08-02-17 @ 14:37) (18 - 22)  SpO2: 95% (08-02-17 @ 14:37) (94% - 100%)      02 Aug 2017 07:01  -  02 Aug 2017 14:51  --------------------------------------------------------  IN: 400 mL / OUT: 0 mL / NET: 400 mL      Physical Exam:  General: NAD, sitting up in bed during interview   CV: RR, S1, S2  Lungs: CTA b/l  Abdomen: Soft, appropriately tender, non-distended, +BS  Incision: CDI x 3, serosanguinous fluid in umbilical site that was present earlier is now dry  Ext: No pain or swelling     Labs:                        11.1   3.8   )-----------( 186      ( 01 Aug 2017 16:41 )             32.2         MEDICATIONS  (STANDING):  acetaminophen   Tablet. 975 milliGRAM(s) Oral every 6 hours  simethicone 80 milliGRAM(s) Chew every 12 hours  ibuprofen  Tablet 600 milliGRAM(s) Oral every 6 hours  polyethylene glycol 3350 17 Gram(s) Oral daily    MEDICATIONS  (PRN):  docusate sodium 100 milliGRAM(s) Oral three times a day PRN stool softening.  oxyCODONE    IR 5 milliGRAM(s) Oral every 4 hours PRN Moderate Pain (4 - 6)  oxyCODONE    IR 10 milliGRAM(s) Oral every 4 hours PRN Severe Pain (7 - 10)  ondansetron    Tablet 8 milliGRAM(s) Oral every 8 hours PRN Nausea and/or Vomiting

## 2017-08-02 NOTE — PROGRESS NOTE ADULT - PROBLEM SELECTOR PLAN 1
-continue PO pain medications as working for patient   -continue reg diet  -attending physician to see patient   -to possibly discharge after attending sees patient     Dw. Dr. Baires via Dr. Hue Gonzalez PGY1

## 2017-08-02 NOTE — DISCHARGE NOTE ADULT - PATIENT PORTAL LINK FT
“You can access the [facility] Patient Portal, offered by [facility], by registering with the following website: [email or web address]”

## 2017-08-02 NOTE — PROGRESS NOTE ADULT - ASSESSMENT
A/P: 27y  s/p  RA myomectomy a/w lower abdominal pain and nausea and vomiting x 2 days. Patient is stable and doing well.  CT A/P: no bowel obstruction. A/P: 27y  s/p  RA myomectomy a/w lower abdominal pain and nausea and vomiting x 2 days. CT A/P: no bowel obstruction. Patient is stable and doing well.

## 2017-08-02 NOTE — DISCHARGE NOTE ADULT - MEDICATION SUMMARY - MEDICATIONS TO TAKE
I will START or STAY ON the medications listed below when I get home from the hospital:    Percocet 5/325 oral tablet  -- 1 tab(s) by mouth every 6 hours, As Needed MDD:4 tabs  -- Caution federal law prohibits the transfer of this drug to any person other  than the person for whom it was prescribed.  May cause drowsiness.  Alcohol may intensify this effect.  Use care when operating dangerous machinery.  This prescription cannot be refilled.  This product contains acetaminophen.  Do not use  with any other product containing acetaminophen to prevent possible liver damage.  Using more of this medication than prescribed may cause serious breathing problems.    -- Indication: For Pain    ibuprofen 600 mg oral tablet  -- 1 tab(s) by mouth every 6 hours  -- Indication: For Pain I will START or STAY ON the medications listed below when I get home from the hospital:    ondansetron 4 mg oral tablet  -- 1 tab(s) by mouth every 8 hours, As Needed, Nausea and/or Vomiting MDD:3  -- Indication: For Nausea    ibuprofen 600 mg oral tablet  -- 1 tab(s) by mouth every 6 hours  -- Indication: For Postoperative pain    acetaminophen 325 mg oral tablet  -- 3 tab(s) by mouth every 6 hours  -- Indication: For Postoperative pain    oxyCODONE 5 mg oral tablet  -- 1 tab(s) by mouth every 4 hours, As needed, Moderate Pain (4 - 6)  -- Indication: For Postoperative pain    polyethylene glycol 3350 oral powder for reconstitution  -- 17 gram(s) by mouth once a day  -- Indication: For constipation    docusate sodium 100 mg oral capsule  -- 1 cap(s) by mouth 3 times a day, As needed, stool softening.  -- Indication: For constipation

## 2017-08-02 NOTE — PROGRESS NOTE ADULT - ASSESSMENT
A/P: 27y a/w lower abdominal pain and N/V x2 days s/p RA-myomectomy Day #5. Abdominal pain is reducing with current medication regime. Patient is stable and doing well.

## 2017-08-03 VITALS
OXYGEN SATURATION: 98 % | RESPIRATION RATE: 18 BRPM | DIASTOLIC BLOOD PRESSURE: 61 MMHG | SYSTOLIC BLOOD PRESSURE: 96 MMHG | HEART RATE: 94 BPM | TEMPERATURE: 98 F

## 2017-08-03 LAB
BASOPHILS # BLD AUTO: 0 K/UL — SIGNIFICANT CHANGE UP (ref 0–0.2)
BASOPHILS NFR BLD AUTO: 0.9 % — SIGNIFICANT CHANGE UP (ref 0–2)
EOSINOPHIL # BLD AUTO: 0.1 K/UL — SIGNIFICANT CHANGE UP (ref 0–0.5)
EOSINOPHIL NFR BLD AUTO: 3.1 % — SIGNIFICANT CHANGE UP (ref 0–6)
HCT VFR BLD CALC: 33.4 % — LOW (ref 34.5–45)
HGB BLD-MCNC: 11.2 G/DL — LOW (ref 11.5–15.5)
LYMPHOCYTES # BLD AUTO: 1.5 K/UL — SIGNIFICANT CHANGE UP (ref 1–3.3)
LYMPHOCYTES # BLD AUTO: 37.9 % — SIGNIFICANT CHANGE UP (ref 13–44)
MCHC RBC-ENTMCNC: 27.8 PG — SIGNIFICANT CHANGE UP (ref 27–34)
MCHC RBC-ENTMCNC: 33.4 GM/DL — SIGNIFICANT CHANGE UP (ref 32–36)
MCV RBC AUTO: 83.3 FL — SIGNIFICANT CHANGE UP (ref 80–100)
MONOCYTES # BLD AUTO: 0.3 K/UL — SIGNIFICANT CHANGE UP (ref 0–0.9)
MONOCYTES NFR BLD AUTO: 7 % — SIGNIFICANT CHANGE UP (ref 2–14)
NEUTROPHILS # BLD AUTO: 2 K/UL — SIGNIFICANT CHANGE UP (ref 1.8–7.4)
NEUTROPHILS NFR BLD AUTO: 51.1 % — SIGNIFICANT CHANGE UP (ref 43–77)
PLATELET # BLD AUTO: 235 K/UL — SIGNIFICANT CHANGE UP (ref 150–400)
RBC # BLD: 4.01 M/UL — SIGNIFICANT CHANGE UP (ref 3.8–5.2)
RBC # FLD: 11.2 % — SIGNIFICANT CHANGE UP (ref 10.3–14.5)
WBC # BLD: 3.9 K/UL — SIGNIFICANT CHANGE UP (ref 3.8–10.5)
WBC # FLD AUTO: 3.9 K/UL — SIGNIFICANT CHANGE UP (ref 3.8–10.5)

## 2017-08-03 PROCEDURE — 84132 ASSAY OF SERUM POTASSIUM: CPT

## 2017-08-03 PROCEDURE — 84295 ASSAY OF SERUM SODIUM: CPT

## 2017-08-03 PROCEDURE — 82947 ASSAY GLUCOSE BLOOD QUANT: CPT

## 2017-08-03 PROCEDURE — 85014 HEMATOCRIT: CPT

## 2017-08-03 PROCEDURE — 85027 COMPLETE CBC AUTOMATED: CPT

## 2017-08-03 PROCEDURE — 99285 EMERGENCY DEPT VISIT HI MDM: CPT | Mod: 25

## 2017-08-03 PROCEDURE — 82330 ASSAY OF CALCIUM: CPT

## 2017-08-03 PROCEDURE — 96374 THER/PROPH/DIAG INJ IV PUSH: CPT | Mod: XU

## 2017-08-03 PROCEDURE — 83605 ASSAY OF LACTIC ACID: CPT

## 2017-08-03 PROCEDURE — G0378: CPT

## 2017-08-03 PROCEDURE — 74177 CT ABD & PELVIS W/CONTRAST: CPT

## 2017-08-03 PROCEDURE — 80053 COMPREHEN METABOLIC PANEL: CPT

## 2017-08-03 PROCEDURE — 81001 URINALYSIS AUTO W/SCOPE: CPT

## 2017-08-03 PROCEDURE — 82435 ASSAY OF BLOOD CHLORIDE: CPT

## 2017-08-03 PROCEDURE — 96375 TX/PRO/DX INJ NEW DRUG ADDON: CPT

## 2017-08-03 PROCEDURE — 82803 BLOOD GASES ANY COMBINATION: CPT

## 2017-08-03 RX ORDER — POLYETHYLENE GLYCOL 3350 17 G/17G
17 POWDER, FOR SOLUTION ORAL
Qty: 0 | Refills: 0 | COMMUNITY
Start: 2017-08-03

## 2017-08-03 RX ORDER — ACETAMINOPHEN 500 MG
3 TABLET ORAL
Qty: 0 | Refills: 0 | COMMUNITY
Start: 2017-08-03

## 2017-08-03 RX ORDER — ONDANSETRON 8 MG/1
1 TABLET, FILM COATED ORAL
Qty: 10 | Refills: 0 | OUTPATIENT
Start: 2017-08-03

## 2017-08-03 RX ORDER — OXYCODONE HYDROCHLORIDE 5 MG/1
1 TABLET ORAL
Qty: 0 | Refills: 0 | COMMUNITY
Start: 2017-08-03

## 2017-08-03 RX ORDER — DOCUSATE SODIUM 100 MG
1 CAPSULE ORAL
Qty: 0 | Refills: 0 | COMMUNITY
Start: 2017-08-03

## 2017-08-03 RX ADMIN — Medication 975 MILLIGRAM(S): at 13:25

## 2017-08-03 RX ADMIN — OXYCODONE HYDROCHLORIDE 5 MILLIGRAM(S): 5 TABLET ORAL at 10:25

## 2017-08-03 RX ADMIN — Medication 600 MILLIGRAM(S): at 06:13

## 2017-08-03 RX ADMIN — Medication 975 MILLIGRAM(S): at 05:11

## 2017-08-03 RX ADMIN — Medication 600 MILLIGRAM(S): at 13:25

## 2017-08-03 RX ADMIN — Medication 975 MILLIGRAM(S): at 12:29

## 2017-08-03 RX ADMIN — Medication 600 MILLIGRAM(S): at 12:29

## 2017-08-03 RX ADMIN — Medication 975 MILLIGRAM(S): at 00:15

## 2017-08-03 RX ADMIN — OXYCODONE HYDROCHLORIDE 5 MILLIGRAM(S): 5 TABLET ORAL at 09:27

## 2017-08-03 RX ADMIN — POLYETHYLENE GLYCOL 3350 17 GRAM(S): 17 POWDER, FOR SOLUTION ORAL at 13:20

## 2017-08-03 RX ADMIN — Medication 100 MILLIGRAM(S): at 16:39

## 2017-08-03 RX ADMIN — Medication 975 MILLIGRAM(S): at 06:13

## 2017-08-03 RX ADMIN — Medication 600 MILLIGRAM(S): at 00:15

## 2017-08-03 RX ADMIN — SIMETHICONE 80 MILLIGRAM(S): 80 TABLET, CHEWABLE ORAL at 05:12

## 2017-08-03 RX ADMIN — Medication 600 MILLIGRAM(S): at 05:12

## 2017-08-03 NOTE — PROGRESS NOTE ADULT - PROBLEM SELECTOR PLAN 1
Neuro: Tylenol, Motrin standing and Oxycodone PRN  CV: Hemodynamically stable, monitor VS per routine   Pulm: Saturating well on room air, encourage oob/amb  GI: Continue regular diet, senna colace and Zofran   : Voiding spontaneously   Heme: SCD for DVT PPX  Dispo: To be seen by MIS fellow/attending this AM, Possible DC this AM with pain control and appropriate CBC     Dw. Dr. Sravan Gonzalez PGY1 Neuro: Tylenol, Motrin standing and Oxycodone PRN  CV: Hemodynamically stable, monitor VS per routine   Pulm: Saturating well on room air, encourage oob/amb  GI: Continue regular diet, senna colace and Zofran   : Voiding spontaneously   Heme: SCD for DVT PPX  Dispo: To be seen by MIS fellow/attending this AM, Possible DC this AM with pain control and appropriate CBC     Dw. Dr. Sue Gonzalez PGY1

## 2017-08-03 NOTE — PROGRESS NOTE ADULT - ASSESSMENT
A/P: 27y  s/p  RA myomectomy a/w lower abdominal pain and nausea and vomiting x 2 days. CT A/P: no bowel obstruction. Patient is stable and doing well. A/P: 27y  s/p  RA myomectomy a/w lower abdominal pain and nausea and vomiting x 2 days. CT A/P: no bowel obstruction. Patient is stable and doing well.  Pain control improved.

## 2017-08-03 NOTE — PROGRESS NOTE ADULT - ATTENDING COMMENTS
I have seen and evaluated this patient and agree with above. pain improved with standing motrin and tylenol. nausea improved, tolerating PO. no WBC and hct stable. Abdomen benign. Passing flatus.   d/c home this am with plan for   standing motrin 600mg q6h, standing tylenol 650mg q6h, oxy 5-10mg q4h PRN severe pain  colace 100mg BID, miralax  zofran 8mg PRN nausea  f/u in office  NUZHAT Baires MD

## 2017-08-03 NOTE — PROGRESS NOTE ADULT - SUBJECTIVE AND OBJECTIVE BOX
R1 GYN Progress Note    POD#6   HD#2    Patient seen and examined at bedside.  No acute events overnight. Pt states that her pain control is improving. She states that Motrin is covering her pain well. She is tolerating a regular diet. She endorses passing flatus. She denies F/C, N/V, SOB, or CP.    Vital Signs Last 24 Hours  T(C): 36.8 (08-02-17 @ 05:50), Max: 36.9 (08-01-17 @ 14:25)  HR: 74 (08-02-17 @ 05:50) (70 - 78)  BP: 93/56 (08-02-17 @ 05:50) (93/56 - 115/72)  RR: 22 (08-02-17 @ 05:50) (18 - 22)  SpO2: 99% (08-02-17 @ 05:50) (98% - 100%)      Physical Exam:  General: NAD  CV: RR, S1, S2, no M/R/G  Lungs: CTA b/l,   Abdomen: Soft, appropriately tender, non-distended  Incision:3 port sites present and intact. Minimal serosanguinous fluid from umbilical port site, non erythematous.  Ext: No pain or swelling , non tender B/L , no edema    Labs:                        11.1   3.8   )-----------( 186      ( 01 Aug 2017 16:41 )  ----> AM CBC Pending              32.2       CTAP: Small to moderate volume of pneumoperitoneum and small volume abdominal   and pelvic ascites, likely postsurgical. No loculated rim-enhancing   collection to suggest abscess. No bowel obstruction or overt bowel wall   thickening.      MEDICATIONS  (STANDING):  acetaminophen   Tablet. 975 milliGRAM(s) Oral every 6 hours  simethicone 80 milliGRAM(s) Chew every 12 hours    MEDICATIONS  (PRN):  ibuprofen  Tablet 600 milliGRAM(s) Oral every 6 hours PRN mild pain  senna 2 Tablet(s) Oral at bedtime PRN Constipation  docusate sodium 100 milliGRAM(s) Oral three times a day PRN stool softening.  oxyCODONE    IR 5 milliGRAM(s) Oral every 4 hours PRN Moderate Pain (4 - 6)  oxyCODONE    IR 10 milliGRAM(s) Oral every 4 hours PRN Severe Pain (7 - 10)      A/P: 27y  s/p  RA myomectomy a/w lower abdominal pain and nausea and vomiting x 2 days. Patient is stable and doing well.  CT A/P: no bowel obstruction. R1 GYN Progress Note    (POD#6)   HD#2    Patient seen and examined at bedside.  No acute events overnight. Pt states that her pain control is improving. She states that Motrin is covering her pain well. She is tolerating a regular diet. She endorses passing flatus. She denies F/C, N/V, SOB, or CP.    Vital Signs Last 24 Hours  T(C): 36.8 (08-02-17 @ 05:50), Max: 36.9 (08-01-17 @ 14:25)  HR: 74 (08-02-17 @ 05:50) (70 - 78)  BP: 93/56 (08-02-17 @ 05:50) (93/56 - 115/72)  RR: 22 (08-02-17 @ 05:50) (18 - 22)  SpO2: 99% (08-02-17 @ 05:50) (98% - 100%)      Physical Exam:  General: NAD  CV: RR, S1, S2, no M/R/G  Lungs: CTA b/l,   Abdomen: Soft, appropriately tender, non-distended  Incision:3 port sites present and intact. Minimal serosanguinous fluid from umbilical port site, non erythematous.  Ext: No pain or swelling , non tender B/L , no edema    Labs:                        11.1   3.8   )-----------( 186      ( 01 Aug 2017 16:41 )  ----> AM CBC Pending              32.2     CTAP(8/1): Small to moderate volume of pneumoperitoneum and small volume abdominal   and pelvic ascites, likely postsurgical. No loculated rim-enhancing   collection to suggest abscess. No bowel obstruction or overt bowel wall   thickening.    MEDICATIONS  (STANDING):  acetaminophen   Tablet. 975 milliGRAM(s) Oral every 6 hours  simethicone 80 milliGRAM(s) Chew every 12 hours  ibuprofen  Tablet 600 milliGRAM(s) Oral every 6 hours  polyethylene glycol 3350 17 Gram(s) Oral daily    MEDICATIONS  (PRN):  docusate sodium 100 milliGRAM(s) Oral three times a day PRN stool softening.  oxyCODONE    IR 5 milliGRAM(s) Oral every 4 hours PRN Moderate Pain (4 - 6)  oxyCODONE    IR 10 milliGRAM(s) Oral every 4 hours PRN Severe Pain (7 - 10)  ondansetron    Tablet 8 milliGRAM(s) Oral every 8 hours PRN Nausea and/or Vomiting

## 2017-08-08 ENCOUNTER — APPOINTMENT (OUTPATIENT)
Dept: OBGYN | Facility: CLINIC | Age: 27
End: 2017-08-08
Payer: COMMERCIAL

## 2017-08-08 PROCEDURE — 99024 POSTOP FOLLOW-UP VISIT: CPT

## 2017-08-29 ENCOUNTER — APPOINTMENT (OUTPATIENT)
Dept: OBGYN | Facility: CLINIC | Age: 27
End: 2017-08-29
Payer: COMMERCIAL

## 2017-08-29 PROCEDURE — 99024 POSTOP FOLLOW-UP VISIT: CPT

## 2017-09-12 ENCOUNTER — RX RENEWAL (OUTPATIENT)
Age: 27
End: 2017-09-12

## 2017-11-30 ENCOUNTER — APPOINTMENT (OUTPATIENT)
Dept: OBGYN | Facility: CLINIC | Age: 27
End: 2017-11-30
Payer: COMMERCIAL

## 2017-11-30 DIAGNOSIS — Z87.42 PERSONAL HISTORY OF OTHER DISEASES OF THE FEMALE GENITAL TRACT: ICD-10-CM

## 2017-11-30 PROCEDURE — 99213 OFFICE O/P EST LOW 20 MIN: CPT

## 2017-11-30 RX ORDER — NAPROXEN 500 MG/1
500 TABLET ORAL TWICE DAILY
Qty: 30 | Refills: 2 | Status: ACTIVE | COMMUNITY
Start: 2017-11-30 | End: 1900-01-01

## 2017-11-30 RX ORDER — NORGESTIMATE AND ETHINYL ESTRADIOL 0.25-0.035
0.25-35 KIT ORAL DAILY
Qty: 90 | Refills: 3 | Status: ACTIVE | COMMUNITY
Start: 2017-11-30 | End: 1900-01-01

## 2017-11-30 RX ORDER — TERCONAZOLE 4 MG/G
0.4 CREAM VAGINAL
Qty: 3 | Refills: 0 | Status: ACTIVE | COMMUNITY
Start: 2017-11-30 | End: 1900-01-01

## 2018-05-04 ENCOUNTER — APPOINTMENT (OUTPATIENT)
Dept: OBGYN | Facility: CLINIC | Age: 28
End: 2018-05-04
Payer: COMMERCIAL

## 2018-05-04 VITALS
BODY MASS INDEX: 16.88 KG/M2 | WEIGHT: 86 LBS | DIASTOLIC BLOOD PRESSURE: 64 MMHG | SYSTOLIC BLOOD PRESSURE: 102 MMHG | HEIGHT: 60 IN

## 2018-05-04 DIAGNOSIS — D25.9 LEIOMYOMA OF UTERUS, UNSPECIFIED: ICD-10-CM

## 2018-05-04 PROCEDURE — 99395 PREV VISIT EST AGE 18-39: CPT

## 2018-05-04 RX ORDER — ETHINYL ESTRADIOL AND LEVONORGESTREL 150-30(84)
0.15-0.03 &0.01 KIT ORAL DAILY
Qty: 90 | Refills: 3 | Status: ACTIVE | COMMUNITY
Start: 2018-05-04 | End: 1900-01-01

## 2018-07-25 ENCOUNTER — RX RENEWAL (OUTPATIENT)
Age: 28
End: 2018-07-25

## 2018-09-07 PROBLEM — N92.0 EXCESSIVE AND FREQUENT MENSTRUATION WITH REGULAR CYCLE: Chronic | Status: ACTIVE | Noted: 2017-07-21

## 2018-09-27 ENCOUNTER — FORM ENCOUNTER (OUTPATIENT)
Age: 28
End: 2018-09-27

## 2018-09-28 ENCOUNTER — OUTPATIENT (OUTPATIENT)
Dept: OUTPATIENT SERVICES | Facility: HOSPITAL | Age: 28
LOS: 1 days | End: 2018-09-28
Payer: COMMERCIAL

## 2018-09-28 ENCOUNTER — APPOINTMENT (OUTPATIENT)
Dept: ULTRASOUND IMAGING | Facility: IMAGING CENTER | Age: 28
End: 2018-09-28
Payer: COMMERCIAL

## 2018-09-28 DIAGNOSIS — D25.9 LEIOMYOMA OF UTERUS, UNSPECIFIED: ICD-10-CM

## 2018-09-28 DIAGNOSIS — D25.9 LEIOMYOMA OF UTERUS, UNSPECIFIED: Chronic | ICD-10-CM

## 2018-09-28 DIAGNOSIS — N60.09 SOLITARY CYST OF UNSPECIFIED BREAST: Chronic | ICD-10-CM

## 2018-09-28 PROCEDURE — 76830 TRANSVAGINAL US NON-OB: CPT | Mod: 26

## 2018-09-28 PROCEDURE — 76856 US EXAM PELVIC COMPLETE: CPT

## 2018-09-28 PROCEDURE — 76856 US EXAM PELVIC COMPLETE: CPT | Mod: 26

## 2018-09-28 PROCEDURE — 76830 TRANSVAGINAL US NON-OB: CPT

## 2018-10-02 ENCOUNTER — RX RENEWAL (OUTPATIENT)
Age: 28
End: 2018-10-02

## 2018-10-03 ENCOUNTER — RX RENEWAL (OUTPATIENT)
Age: 28
End: 2018-10-03

## 2018-10-25 ENCOUNTER — RX RENEWAL (OUTPATIENT)
Age: 28
End: 2018-10-25

## 2019-04-22 ENCOUNTER — RX RENEWAL (OUTPATIENT)
Age: 29
End: 2019-04-22

## 2019-06-06 NOTE — DISCHARGE NOTE ADULT - NS AS DC STROKE DX YN
[facility] Emergency Department      [address] [place] [address], [address]            PROOF OF PRESENCE      To Whom It May Concern:    Ailin Escobedo was present in the Emergency Department at [facility] on 06/06/19. Sincerely,        CHECO Clifton PA-C/ no

## 2019-07-18 DIAGNOSIS — N83.299 OTHER OVARIAN CYST, UNSPECIFIED SIDE: ICD-10-CM

## 2019-08-05 ENCOUNTER — RX RENEWAL (OUTPATIENT)
Age: 29
End: 2019-08-05

## 2019-11-03 ENCOUNTER — RX RENEWAL (OUTPATIENT)
Age: 29
End: 2019-11-03

## 2020-02-03 ENCOUNTER — RX RENEWAL (OUTPATIENT)
Age: 30
End: 2020-02-03

## 2020-05-03 ENCOUNTER — RX RENEWAL (OUTPATIENT)
Age: 30
End: 2020-05-03

## 2020-05-04 RX ORDER — LEVONORGESTREL AND ETHINYL ESTRADIOL AND ETHINYL ESTRADIOL 150-30(84)
0.15-0.03 &0.01 KIT ORAL
Qty: 91 | Refills: 0 | Status: ACTIVE | COMMUNITY
Start: 2019-04-22 | End: 1900-01-01

## 2020-05-04 RX ORDER — NAPROXEN 500 MG/1
500 TABLET ORAL
Qty: 30 | Refills: 0 | Status: ACTIVE | COMMUNITY
Start: 2018-05-04 | End: 1900-01-01

## 2020-08-05 ENCOUNTER — RX RENEWAL (OUTPATIENT)
Age: 30
End: 2020-08-05

## 2020-08-18 ENCOUNTER — APPOINTMENT (OUTPATIENT)
Dept: ULTRASOUND IMAGING | Facility: CLINIC | Age: 30
End: 2020-08-18

## 2020-08-21 ENCOUNTER — APPOINTMENT (OUTPATIENT)
Dept: OBGYN | Facility: CLINIC | Age: 30
End: 2020-08-21

## 2021-01-22 ENCOUNTER — TRANSCRIPTION ENCOUNTER (OUTPATIENT)
Age: 31
End: 2021-01-22

## 2021-08-18 ENCOUNTER — OUTPATIENT (OUTPATIENT)
Dept: OUTPATIENT SERVICES | Facility: HOSPITAL | Age: 31
LOS: 1 days | End: 2021-08-18
Payer: COMMERCIAL

## 2021-08-18 ENCOUNTER — NON-APPOINTMENT (OUTPATIENT)
Age: 31
End: 2021-08-18

## 2021-08-18 ENCOUNTER — APPOINTMENT (OUTPATIENT)
Dept: ULTRASOUND IMAGING | Facility: CLINIC | Age: 31
End: 2021-08-18
Payer: COMMERCIAL

## 2021-08-18 DIAGNOSIS — N60.09 SOLITARY CYST OF UNSPECIFIED BREAST: Chronic | ICD-10-CM

## 2021-08-18 DIAGNOSIS — N83.299 OTHER OVARIAN CYST, UNSPECIFIED SIDE: ICD-10-CM

## 2021-08-18 DIAGNOSIS — D25.9 LEIOMYOMA OF UTERUS, UNSPECIFIED: Chronic | ICD-10-CM

## 2021-08-18 DIAGNOSIS — D25.9 LEIOMYOMA OF UTERUS, UNSPECIFIED: ICD-10-CM

## 2021-08-18 PROCEDURE — 76830 TRANSVAGINAL US NON-OB: CPT

## 2021-08-18 PROCEDURE — 76856 US EXAM PELVIC COMPLETE: CPT

## 2021-08-18 PROCEDURE — 76830 TRANSVAGINAL US NON-OB: CPT | Mod: 26

## 2021-08-18 PROCEDURE — 76856 US EXAM PELVIC COMPLETE: CPT | Mod: 26

## 2021-08-19 ENCOUNTER — RX RENEWAL (OUTPATIENT)
Age: 31
End: 2021-08-19

## 2022-03-18 ENCOUNTER — APPOINTMENT (OUTPATIENT)
Dept: OBGYN | Facility: CLINIC | Age: 32
End: 2022-03-18
Payer: COMMERCIAL

## 2022-03-18 VITALS
HEIGHT: 60 IN | SYSTOLIC BLOOD PRESSURE: 90 MMHG | DIASTOLIC BLOOD PRESSURE: 60 MMHG | BODY MASS INDEX: 19.63 KG/M2 | WEIGHT: 100 LBS

## 2022-03-18 DIAGNOSIS — Z01.419 ENCOUNTER FOR GYNECOLOGICAL EXAMINATION (GENERAL) (ROUTINE) W/OUT ABNORMAL FINDINGS: ICD-10-CM

## 2022-03-18 DIAGNOSIS — R10.2 PELVIC AND PERINEAL PAIN: ICD-10-CM

## 2022-03-18 DIAGNOSIS — Z87.42 PERSONAL HISTORY OF OTHER DISEASES OF THE FEMALE GENITAL TRACT: ICD-10-CM

## 2022-03-18 DIAGNOSIS — D21.9 BENIGN NEOPLASM OF CONNECTIVE AND OTHER SOFT TISSUE, UNSPECIFIED: ICD-10-CM

## 2022-03-18 DIAGNOSIS — N92.1 EXCESSIVE AND FREQUENT MENSTRUATION WITH IRREGULAR CYCLE: ICD-10-CM

## 2022-03-18 DIAGNOSIS — B37.3 CANDIDIASIS OF VULVA AND VAGINA: ICD-10-CM

## 2022-03-18 PROCEDURE — 99385 PREV VISIT NEW AGE 18-39: CPT

## 2022-03-18 RX ORDER — NAPROXEN 500 MG/1
500 TABLET ORAL
Qty: 30 | Refills: 1 | Status: ACTIVE | COMMUNITY
Start: 2022-03-18 | End: 1900-01-01

## 2022-03-18 RX ORDER — NORGESTIMATE AND ETHINYL ESTRADIOL 0.25-0.035
0.25-35 KIT ORAL DAILY
Qty: 90 | Refills: 3 | Status: ACTIVE | COMMUNITY
Start: 2022-03-18 | End: 1900-01-01

## 2022-03-18 RX ORDER — FLUCONAZOLE 150 MG/1
150 TABLET ORAL
Qty: 1 | Refills: 0 | Status: COMPLETED | COMMUNITY
Start: 2022-03-18 | End: 2022-03-19

## 2022-03-18 RX ORDER — TERCONAZOLE 4 MG/G
0.4 CREAM VAGINAL
Qty: 1 | Refills: 0 | Status: ACTIVE | COMMUNITY
Start: 2022-03-18 | End: 1900-01-01

## 2022-03-18 NOTE — DISCUSSION/SUMMARY
[FreeTextEntry1] : 31 y/o P0 LMP 2/26\par Pap with cotesting\par Fibroids, Menometrorrhagia, referred for labs and pelvic us \par Endometirosis, pelvic pain\par OCP, Naproxsyn\par Discussed fertility\par

## 2022-03-25 DIAGNOSIS — N76.0 ACUTE VAGINITIS: ICD-10-CM

## 2022-03-25 DIAGNOSIS — B96.89 ACUTE VAGINITIS: ICD-10-CM

## 2022-03-25 LAB
C TRACH RRNA SPEC QL NAA+PROBE: NOT DETECTED
CANDIDA VAG CYTO: DETECTED
CYTOLOGY CVX/VAG DOC THIN PREP: ABNORMAL
G VAGINALIS+PREV SP MTYP VAG QL MICRO: DETECTED
HPV HIGH+LOW RISK DNA PNL CVX: NOT DETECTED
N GONORRHOEA RRNA SPEC QL NAA+PROBE: NOT DETECTED
SOURCE AMPLIFICATION: NORMAL
T VAGINALIS VAG QL WET PREP: NOT DETECTED

## 2022-03-25 RX ORDER — METRONIDAZOLE 7.5 MG/G
0.75 GEL VAGINAL TWICE DAILY
Qty: 1 | Refills: 0 | Status: ACTIVE | COMMUNITY
Start: 2022-03-25 | End: 1900-01-01

## 2022-12-02 NOTE — H&P PST ADULT - NSANTHAGERD_ENT_A_CORE
· Patient complains of chronic right hip pain    · CT was done and this was not felt to be the source of infection, there are severe degenerative changes  ·  requests orthopedic consult No

## 2023-07-14 ENCOUNTER — APPOINTMENT (OUTPATIENT)
Dept: OBGYN | Facility: CLINIC | Age: 33
End: 2023-07-14
Payer: COMMERCIAL

## 2023-07-14 VITALS
SYSTOLIC BLOOD PRESSURE: 102 MMHG | HEIGHT: 60 IN | WEIGHT: 96 LBS | BODY MASS INDEX: 18.85 KG/M2 | DIASTOLIC BLOOD PRESSURE: 71 MMHG

## 2023-07-14 DIAGNOSIS — N89.8 OTHER SPECIFIED NONINFLAMMATORY DISORDERS OF VAGINA: ICD-10-CM

## 2023-07-14 DIAGNOSIS — N80.9 ENDOMETRIOSIS, UNSPECIFIED: ICD-10-CM

## 2023-07-14 DIAGNOSIS — N76.0 ACUTE VAGINITIS: ICD-10-CM

## 2023-07-14 DIAGNOSIS — B96.89 ACUTE VAGINITIS: ICD-10-CM

## 2023-07-14 DIAGNOSIS — Z01.419 ENCOUNTER FOR GYNECOLOGICAL EXAMINATION (GENERAL) (ROUTINE) W/OUT ABNORMAL FINDINGS: ICD-10-CM

## 2023-07-14 DIAGNOSIS — N97.9 FEMALE INFERTILITY, UNSPECIFIED: ICD-10-CM

## 2023-07-14 DIAGNOSIS — Z87.42 PERSONAL HISTORY OF OTHER DISEASES OF THE FEMALE GENITAL TRACT: ICD-10-CM

## 2023-07-14 PROCEDURE — 99395 PREV VISIT EST AGE 18-39: CPT

## 2023-07-14 RX ORDER — NAPROXEN 500 MG/1
500 TABLET ORAL
Qty: 30 | Refills: 2 | Status: ACTIVE | COMMUNITY
Start: 2023-07-14 | End: 1900-01-01

## 2023-07-14 RX ORDER — FLUCONAZOLE 150 MG/1
150 TABLET ORAL
Qty: 1 | Refills: 1 | Status: ACTIVE | COMMUNITY
Start: 2023-07-14 | End: 1900-01-01

## 2023-07-14 RX ORDER — METRONIDAZOLE 7.5 MG/G
0.75 GEL VAGINAL
Qty: 1 | Refills: 0 | Status: ACTIVE | COMMUNITY
Start: 2023-07-14 | End: 1900-01-01

## 2023-07-14 RX ORDER — TERCONAZOLE 8 MG/G
0.8 CREAM VAGINAL
Qty: 1 | Refills: 1 | Status: ACTIVE | COMMUNITY
Start: 2023-07-14 | End: 1900-01-01

## 2023-07-26 NOTE — HISTORY OF PRESENT ILLNESS
[FreeTextEntry1] : 33-year-old P0 LMP\par History of endometriosis status post ovarian cystectomy with Dr. Solorio\par Patient takes naproxen for dysmenorrhea\par Patient complains of vaginal discharge and odor\par Patient trying to conceive

## 2023-07-26 NOTE — DISCUSSION/SUMMARY
[FreeTextEntry1] : 33-year-old P0 LMP\par History of endometriosis status post ovarian cystectomy with Dr. Solorio\par patient prefers to continue with naproxen for dysmenorrhea, trying to conceive , urine culture, pelvic ultrasound\par Refer to repro [facility] ready to conceive\par Vaginal discharge/odor, affirm, \par f/u 1 year/PRN

## 2023-07-26 NOTE — PHYSICAL EXAM
[Examination Of The Breasts] : a normal appearance [No Masses] : no breast masses were palpable [Labia Majora] : normal [Labia Minora] : normal [Normal] : normal [Uterine Adnexae] : normal [Discharge] :  ~M urethral discharge [FreeTextEntry3] : white discharge, bood tinged, + odor

## 2023-08-06 DIAGNOSIS — N39.0 URINARY TRACT INFECTION, SITE NOT SPECIFIED: ICD-10-CM

## 2023-08-06 LAB
BACTERIA UR CULT: ABNORMAL
CANDIDA VAG CYTO: DETECTED
G VAGINALIS+PREV SP MTYP VAG QL MICRO: DETECTED
T VAGINALIS VAG QL WET PREP: NOT DETECTED

## 2023-08-06 RX ORDER — NITROFURANTOIN (MONOHYDRATE/MACROCRYSTALS) 25; 75 MG/1; MG/1
100 CAPSULE ORAL TWICE DAILY
Qty: 14 | Refills: 0 | Status: ACTIVE | COMMUNITY
Start: 2023-08-06 | End: 1900-01-01

## 2023-11-14 ENCOUNTER — NON-APPOINTMENT (OUTPATIENT)
Age: 33
End: 2023-11-14

## 2023-11-19 ENCOUNTER — NON-APPOINTMENT (OUTPATIENT)
Age: 33
End: 2023-11-19

## 2024-06-03 ENCOUNTER — NON-APPOINTMENT (OUTPATIENT)
Age: 34
End: 2024-06-03

## 2024-06-21 NOTE — PATIENT PROFILE ADULT. - MEDICATIONS BROUGHT TO HOSPITAL, PROFILE
"Patient called to schedule an appointment and to request a medication refill.  She stated she hasn't been keeping up with appointments or taking her medications, since she broke her legs she has been very \"down in the dumps\".    She reports feeling a rapid \"off beat\" heart rate lately, asking if it is related to her not taking BP medication.  She also reports having bad heartburn.  I scheduled her for 6/25 with Dr. Stinson and advised she needs to schedule a physical as well, stated she would when in office.    She requested a refill for her doxazosin and omeprazole.  I advised I would submit the request, but relayed it would be up to Dr. Stinson to refill prior to the OV, since not being seen in over a year.    LOV:  6/8/23  Last Physical:  3/30/23  " no

## 2024-10-14 ENCOUNTER — APPOINTMENT (OUTPATIENT)
Dept: OBGYN | Facility: CLINIC | Age: 34
End: 2024-10-14
Payer: COMMERCIAL

## 2024-10-14 VITALS
HEIGHT: 60 IN | DIASTOLIC BLOOD PRESSURE: 70 MMHG | WEIGHT: 99 LBS | BODY MASS INDEX: 19.44 KG/M2 | SYSTOLIC BLOOD PRESSURE: 102 MMHG

## 2024-10-14 DIAGNOSIS — N76.0 ACUTE VAGINITIS: ICD-10-CM

## 2024-10-14 DIAGNOSIS — Z87.42 PERSONAL HISTORY OF OTHER DISEASES OF THE FEMALE GENITAL TRACT: ICD-10-CM

## 2024-10-14 DIAGNOSIS — N89.8 OTHER SPECIFIED NONINFLAMMATORY DISORDERS OF VAGINA: ICD-10-CM

## 2024-10-14 DIAGNOSIS — Z78.9 OTHER SPECIFIED HEALTH STATUS: ICD-10-CM

## 2024-10-14 PROCEDURE — G0444 DEPRESSION SCREEN ANNUAL: CPT | Mod: 59

## 2024-10-14 PROCEDURE — 99395 PREV VISIT EST AGE 18-39: CPT | Mod: 25

## 2024-10-14 RX ORDER — CLOTRIMAZOLE AND BETAMETHASONE DIPROPIONATE 10; .5 MG/G; MG/G
1-0.05 CREAM TOPICAL TWICE DAILY
Qty: 1 | Refills: 1 | Status: ACTIVE | COMMUNITY
Start: 2024-10-14 | End: 1900-01-01

## 2024-10-14 RX ORDER — FLUCONAZOLE 150 MG/1
150 TABLET ORAL
Qty: 1 | Refills: 1 | Status: ACTIVE | COMMUNITY
Start: 2024-10-14 | End: 1900-01-01

## 2024-10-14 RX ORDER — NAPROXEN 500 MG/1
500 TABLET ORAL
Qty: 30 | Refills: 2 | Status: ACTIVE | COMMUNITY
Start: 2024-10-14 | End: 1900-01-01

## 2024-10-16 ENCOUNTER — NON-APPOINTMENT (OUTPATIENT)
Age: 34
End: 2024-10-16

## 2024-10-16 DIAGNOSIS — N76.0 ACUTE VAGINITIS: ICD-10-CM

## 2024-10-16 DIAGNOSIS — B96.89 ACUTE VAGINITIS: ICD-10-CM

## 2024-10-16 LAB
BV BACTERIA RRNA VAG QL NAA+PROBE: DETECTED
C GLABRATA RNA VAG QL NAA+PROBE: NOT DETECTED
C TRACH RRNA SPEC QL NAA+PROBE: NOT DETECTED
CANDIDA RRNA VAG QL PROBE: DETECTED
N GONORRHOEA RRNA SPEC QL NAA+PROBE: NOT DETECTED
T VAGINALIS RRNA SPEC QL NAA+PROBE: NOT DETECTED

## 2024-10-16 RX ORDER — METRONIDAZOLE 7.5 MG/G
0.75 GEL VAGINAL
Qty: 1 | Refills: 0 | Status: ACTIVE | COMMUNITY
Start: 2024-10-16 | End: 1900-01-01

## 2025-04-01 NOTE — PROGRESS NOTE ADULT - PROBLEM SELECTOR PLAN 1
Neuro: Tylenol, Motrin and Oxycodone PRN  CV: Hemodynamically stable, monitor VS per routine   Pulm: Saturating well on room air, encourage oob/amb  GI: Continue regular diet, senna colace and Zofran   : Voiding spontaneously   Dispo: To be seen by MIS fellow/attending unable to assess